# Patient Record
Sex: FEMALE | Race: WHITE
[De-identification: names, ages, dates, MRNs, and addresses within clinical notes are randomized per-mention and may not be internally consistent; named-entity substitution may affect disease eponyms.]

---

## 2017-11-16 ENCOUNTER — HOSPITAL ENCOUNTER (OUTPATIENT)
Dept: HOSPITAL 74 - FASU | Age: 72
LOS: 1 days | Discharge: HOME | End: 2017-11-17
Attending: ORTHOPAEDIC SURGERY
Payer: COMMERCIAL

## 2017-11-16 VITALS — BODY MASS INDEX: 36.7 KG/M2

## 2017-11-16 DIAGNOSIS — G89.29: ICD-10-CM

## 2017-11-16 DIAGNOSIS — M54.9: Primary | ICD-10-CM

## 2017-11-16 PROCEDURE — 0JH70BZ INSERTION OF SINGLE ARRAY STIMULATOR GENERATOR INTO BACK SUBCUTANEOUS TISSUE AND FASCIA, OPEN APPROACH: ICD-10-PCS | Performed by: ORTHOPAEDIC SURGERY

## 2017-11-16 PROCEDURE — 00HU0MZ INSERTION OF NEUROSTIMULATOR LEAD INTO SPINAL CANAL, OPEN APPROACH: ICD-10-PCS | Performed by: ORTHOPAEDIC SURGERY

## 2017-11-16 PROCEDURE — 01N80ZZ RELEASE THORACIC NERVE, OPEN APPROACH: ICD-10-PCS | Performed by: ORTHOPAEDIC SURGERY

## 2017-11-16 RX ADMIN — GABAPENTIN SCH MG: 100 CAPSULE ORAL at 21:05

## 2017-11-16 RX ADMIN — ACETAMINOPHEN SCH MG: 325 TABLET ORAL at 19:55

## 2017-11-16 RX ADMIN — HYDROMORPHONE HYDROCHLORIDE PRN MG: 1 INJECTION, SOLUTION INTRAMUSCULAR; INTRAVENOUS; SUBCUTANEOUS at 14:08

## 2017-11-16 RX ADMIN — CLINDAMYCIN PHOSPHATE SCH MLS/HR: 600 INJECTION, SOLUTION INTRAVENOUS at 19:55

## 2017-11-16 RX ADMIN — HYDROMORPHONE HYDROCHLORIDE PRN MG: 1 INJECTION, SOLUTION INTRAMUSCULAR; INTRAVENOUS; SUBCUTANEOUS at 14:18

## 2017-11-16 NOTE — SURG
Surgery First Assist Note


First Assist: Coleen Justice PA-C


Date of Service: 11/16/17


Diagnosis: 





spinal stenosis





Procedure: 








thoracic laminectomy T9-10 and T10-11, spianl cord stimulator


I was present for the entirety of the operative procedure. For further detail, 

please refer to operative report.








Visit type





- Case Type


Case Type: Scheduled Admission





- Emergency


Emergency Visit: No





- New patient


This patient is new to me today: Yes


Date on this admission: 11/16/17

## 2017-11-16 NOTE — OP
DATE OF OPERATION:  11/16/2017

 

PREOPERATIVE DIAGNOSIS:  Chronic back pain.

 

POSTOPERATIVE DIAGNOSIS:  Chronic back pain.

 

SURGERIES:

1.  Placement of spinal cord stimulator.

2.  Thoracic laminectomy.

 

SURGEON:  Lamberto Cope MD

 

ASSISTANT:  MICHELE Eugene

 

ESTIMATED BLOOD LOSS:  100 mL.

 

INTRAVENOUS FLUIDS:  Per Anesthesia.

 

ANESTHESIA:  General.

 

COMPLICATIONS:  There were none.

 

DISPOSITION:  The patient was brought to the PACU in stable condition.

 

INDICATION FOR SURGERY:  The patient is a 72-year-old female who has been suffering

from pain from her back over many years now.  She has been through an exhaustive

course of treatment for this.  She had a spinal cord stimulator trial and she had

received greater than 50% relief.  Unfortunately, her pain continued to persist, and

at that point after obtaining medical clearance, we discussed the placement of a

spinal cord stimulator, and the patient consented to surgery.

 

OPERATIVE NOTE:  The patient was brought to the operating room by Anesthesia.  After

appropriate patient identification was performed, general anesthesia was given.  She

was placed prone onto the OR table with all areas of bony prominences well added at

this time.

 

The C-arm was brought in and the T10-11 level was marked off.  Next, 10 mL of

lidocaine with epinephrine were injected into her back at this time.  Her back was

prepped and draped in the usual sterile manner.  At this point a timeout was

completed.

 

An incision was made from the top of T9 down to the bottom of T11.  Dissection was

carried down to the fascia and the fascia was split open at this time.  Appropriate

retractors were then placed in.  A spinal needle was placed onto the T10 spinous

process.  X-rays taken to confirm this was correct.  Needle was removed.  The spinous

process at T10 was removed.  A full laminectomy was performed from T9 to T11.  In

placing the paddle, it was felt that she had too much stenosis and it would be too

much compression to place the paddle.  Decision was made at that point to place

wires.  Wires were placed.  The wires were tunneled towards the battery.  The pocket

was made for the battery.  Battery was connected to the wires.  Testing was performed

and it appeared okay.  The fascia was closed with a number 1 Vicryl suture,

subcutaneous tissues were closed with 2-0 Vicryl suture, and skin was closed with 3-0

Monocryl suture.  Dermabond was applied.  Steri-Strips were applied.  A sterile

dressing was applied.

 

The patient was placed supine on the OR bed, extubated in the OR, and brought to the

PACU in stable condition.

 

 

LAMBERTO COPE M.D.

 

[id number]

DD: 11/16/2017 13:20

DT: 11/16/2017 14:31

Job #:  [id number]

## 2017-11-16 NOTE — OP
Operative Note





- Note:


Operative Date: 11/16/17


Pre-Operative Diagnosis: spinal stenosis


Operation: thoracic laminectomy T9-10 and T10-11, spianl cord stimulator


Surgeon: Carmelo Cope


Assistant: Coleen Justice


Anesthesiologist/CRNA: Jh Jewell


Anesthesia: General


Estimated Blood Loss (mls): 100


Fluid Volume Replaced (mls): 1,100


Operative Report Dictated: Yes

## 2017-11-17 VITALS — HEART RATE: 88 BPM | DIASTOLIC BLOOD PRESSURE: 79 MMHG | TEMPERATURE: 97.9 F | SYSTOLIC BLOOD PRESSURE: 130 MMHG

## 2017-11-17 RX ADMIN — GABAPENTIN SCH MG: 100 CAPSULE ORAL at 05:36

## 2017-11-17 RX ADMIN — CLINDAMYCIN PHOSPHATE SCH MLS/HR: 600 INJECTION, SOLUTION INTRAVENOUS at 00:44

## 2017-11-17 RX ADMIN — ACETAMINOPHEN SCH MG: 325 TABLET ORAL at 01:06

## 2017-11-17 RX ADMIN — ACETAMINOPHEN SCH MG: 325 TABLET ORAL at 08:00

## 2017-11-17 NOTE — PN
Progress Note (short form)





- Note


Progress Note: 





Retroactive note: patient seen at 7:30 am on 11/17/17





The patient was admitted to the Med-Surg Unit after an elective operative 

intervention for her spinal stenosis. Now POD #1 thoracic laminectomy T9-10 and 

T10-11, spinal cord stimulator implant patient is seen and examines at bedside. 

Pt c/o Left leg pain which is unchanged from pre op and states pain is 

controlled. She denies any fever, chill, n/v/d Cp or SOB. Patient has been OOB 

and ambulating with assistance, She is also voiding spontaneously.





PE: 


A&Ox3, NAD


 VSS


thoracic spine  and left lumbar incisions, with steris in palce and c/d/i with 

no evidence of discharge or tracking erythema. some area of irritation from    

tegaderm over inferior border of dressing area over lumbar paraveterbral 

incision,. no signs of infection. 


Pt with 5/5 UE and 5/5 LE strength


B/L Le compartments soft supple and non-tender to palpation


NVID with +2 pedal pulses


Incisions re-dressed with 4x4 and paper tape








Assesment:


s/p laminectomy with spinal cord stimulator implant doing well





Plan:


Yeny-operative IV ABX have been completed and DVT prophylaxis was achieved with 

SCDs and early ambulation.


The patient ambulated with Physical Therapy and no services were recommended 

upon discharge. Narcotic scripts were checked with [place]  prior to escibe. The 

discharge instructions and an oral pain management plan were reviewed with the 

patient. Patient states that she has tolerated oxycontin and percocet in the 

past. We will give her 5 days worth of medication for management of her acute 

pain and she will follow up with Dr. Bedoya and Dr. Cruz regarding further 

management and restarting her Suboxone. All questions answered. 





Above plan discussed with Dr. Cope and agreed.





<Smita Magaña - Last Filed: 11/17/17 12:53>





Physical Examination


Vital Signs: 


 Vital Signs











Temperature  97.9 F   11/17/17 05:34


 


Pulse Rate  88   11/17/17 05:34


 


Respiratory Rate  20   11/17/17 05:34


 


Blood Pressure  130/79   11/17/17 05:34


 


O2 Sat by Pulse Oximetry (%)  95   11/17/17 05:34








Patient seen and examined





Agree with above





Pain controlled with medicine





Patient to F/U with Dr Bedoya upon discharge





<Carmelo Cope - Last Filed: 11/20/17 09:33>

## 2019-11-30 ENCOUNTER — RESULT REVIEW (OUTPATIENT)
Age: 74
End: 2019-11-30

## 2019-12-19 ENCOUNTER — APPOINTMENT (OUTPATIENT)
Dept: PULMONOLOGY | Facility: CLINIC | Age: 74
End: 2019-12-19

## 2020-03-24 ENCOUNTER — APPOINTMENT (OUTPATIENT)
Dept: PAIN MANAGEMENT | Facility: CLINIC | Age: 75
End: 2020-03-24
Payer: MEDICARE

## 2020-03-24 VITALS
HEIGHT: 65 IN | WEIGHT: 190 LBS | DIASTOLIC BLOOD PRESSURE: 82 MMHG | SYSTOLIC BLOOD PRESSURE: 128 MMHG | BODY MASS INDEX: 31.65 KG/M2

## 2020-03-24 PROCEDURE — 99214 OFFICE O/P EST MOD 30 MIN: CPT

## 2020-03-24 NOTE — REVIEW OF SYSTEMS
[Back Pain] : back pain [Neck Pain] : neck pain [Joint Pain] : joint pain [Joint Stiffness] : joint stiffness [Negative] : Heme/Lymph [Depression] : depression

## 2020-04-03 ENCOUNTER — APPOINTMENT (OUTPATIENT)
Dept: PAIN MANAGEMENT | Facility: CLINIC | Age: 75
End: 2020-04-03
Payer: MEDICARE

## 2020-04-03 PROCEDURE — G2012 BRIEF CHECK IN BY MD/QHP: CPT

## 2020-04-03 NOTE — HISTORY OF PRESENT ILLNESS
[Back Pain] : back pain [Neck Pain] : neck pain [Joint Pain] : joint  [10] : a maximum pain level of 10/10 [Sharp] : sharp [Aching] : aching [Standing] : standing [Walking] : walking [Bending] : bending [Sitting] : sitting [FreeTextEntry1] : HPI\par \par Ms. SARAHI MARTINEZ is a 74 year F with PPHx of mood disorder followed by Dr. Foster, opioid use disorder previously suboxone SL 8-2mg and SL 2-0.5mg but has since discontinued, PMHx of spinal stenosis, HTN, s/p left femoral prosthesis, [employer] Scs device placed by Dr. Muniz.  Presents today with many years of bilateral lower back pain.  Patient states that the pain is worse with transition, axial, and prevents her from being able to tolerate ALDs.  It is even worse now that she is weaned from suboxone.  denies any worsening numbness, weakness, bowel/bladder dysfunction\par

## 2020-04-03 NOTE — ASSESSMENT
[FreeTextEntry1] : back and leg pain likely secondary to lumbar radiculopathy and discogenic pain refractory to conservative treatments in patient with hx of substance use disorder, will obtain MRI LS to evaluate for pathology\par \par may consider PT vs intervention pending eval\par \par patient should consult with Dr. Foster regarding increasing gabapentin to 600mg TID\par \par \par The above diagnosis and treatment plan is medically reasonable and necessary based on the patient encounter.\par \par There were no barriers to communication.\par Informed patient that I would be available for any additional questions.\par Patient was instructed to call with any worsening symptoms including severe pain, new numbness/weakness, or changes in the bowel/bladder function. \par \par Regarding opiate medication to manage pain. I had a detailed discussion with the patient regarding the risks of long-term opioid use, including the potential for medication side effects, hyperaglesia, endocrine dysfunction, addiction, tolerance, constipation, among others. Discussed relevant risks. \par \par \par Instructed patient to maintain pain diary to monitor pain level, mobility, and function.\par \par \par

## 2020-04-03 NOTE — PHYSICAL EXAM
[Normal muscle bulk without asymmetry] : normal muscle bulk without asymmetry [Facet Tenderness] : facet tenderness [Spine: Flexion to ___ degrees, without pain] : spine: flexion to [unfilled] degrees, without pain [Antalgic] : antalgic [Walker] : ambulates with walker [] : Motor: [NL] : normal and symmetric bilaterally [de-identified] : Constitutional: Normal, well developed, no acute distress\par Eyes: Symmetric, External structures \par Oropharynx: Lips normal, symmetric, no external lesions appreciated\par Respiratory: Non-labored breathing, no audible wheezes\par Cardiac: Pulse palpated, no tachycardia\par Vascular: No cyanosis appreciated, no edema in bilateral lower extremities\par GI: Nondistended, no jaundice appreciated\par Neurovascular: CN2-12 grossly intact, Alert and oriented\par MSK: Normal muscle bulk, 5/5 Motor strength B/L in LE\par \par

## 2020-05-15 ENCOUNTER — RESULT REVIEW (OUTPATIENT)
Age: 75
End: 2020-05-15

## 2020-05-18 ENCOUNTER — APPOINTMENT (OUTPATIENT)
Dept: PAIN MANAGEMENT | Facility: CLINIC | Age: 75
End: 2020-05-18

## 2020-05-22 ENCOUNTER — APPOINTMENT (OUTPATIENT)
Dept: PAIN MANAGEMENT | Facility: CLINIC | Age: 75
End: 2020-05-22
Payer: MEDICARE

## 2020-05-22 VITALS
HEIGHT: 65 IN | WEIGHT: 190 LBS | BODY MASS INDEX: 31.65 KG/M2 | DIASTOLIC BLOOD PRESSURE: 82 MMHG | SYSTOLIC BLOOD PRESSURE: 140 MMHG | TEMPERATURE: 98.8 F

## 2020-05-22 PROCEDURE — 99214 OFFICE O/P EST MOD 30 MIN: CPT

## 2020-05-22 RX ORDER — GABAPENTIN 300 MG/1
300 CAPSULE ORAL
Refills: 0 | Status: DISCONTINUED | COMMUNITY
End: 2020-05-22

## 2020-05-22 NOTE — PHYSICAL EXAM
[Normal muscle bulk without asymmetry] : normal muscle bulk without asymmetry [Facet Tenderness] : facet tenderness [Spine: Flexion to ___ degrees, without pain] : spine: flexion to [unfilled] degrees, without pain [Wheelchair] : uses a wheelchair [] : Motor: [Normal] : Normal affect [NL] : normal and symmetric bilaterally

## 2020-05-22 NOTE — HISTORY OF PRESENT ILLNESS
[10 (pain as bad as you can imagine)] : 1. What number best describes your pain on average in the past week? 10/10 pain [10 (completely interferes)] : 3. What number best describes how, during the past week, pain has interfered with your general activity? 10/10 pain [Back Pain] : back pain [Neck Pain] : neck pain [Joint Pain] : joint  [10] : a maximum pain level of 10/10 [Sharp] : sharp [Aching] : aching [Standing] : standing [Walking] : walking [Bending] : bending [Sitting] : sitting [FreeTextEntry1] : Interval Note:\par \par Since last visit the pain is not improved. Patient continues to have significant pain over her bilateral lower back radiating to bilateral buttock.  She states that she continues on gabapentin and started on topamax with Dr. Bass.  She states that the pain persists.  She is utilizing her scs device but it has only mildly improved her pain. Denies any additional weakness, numbness, bowel/bladder dysfunction.  \par \par \par HPI\par \par Ms. SARAHI MARTINEZ is a 74 year F with PPHx of mood disorder followed by Dr. Foster, opioid use disorder previously suboxone SL 8-2mg and SL 2-0.5mg but has since discontinued, PMHx of spinal stenosis, HTN, s/p left femoral prosthesis, [employer] Scs device placed by Dr. Muniz.  Presents today with many years of bilateral lower back pain.  Patient states that the pain is worse with transition, axial, and prevents her from being able to tolerate ALDs.  It is even worse now that she is weaned from suboxone.  denies any worsening numbness, weakness, bowel/bladder dysfunction\par \par \par Previous and current pain medications/doses/effects:\par \par Previous Pain Treatments:\par \par Previous Pain Injections:\par \par Previous Diagnostic Studies/Images:\par \par CT LS 5/2020\par \par There is sacralization of the L5 vertebral body.  The lumbar lordosis is preserved.  The\par vertebral body heights joint are maintained. There is mild disc space narrowing at at L1-L2.  A\par spinal stimulator is noted with the leads entering the thecal sac at the  T11-12 level.  The pre-\par vertebral soft tissues are unremarkable.\par \par  At the L1/2 level, there is a disc bulge without significant spinal canal stenosis or\par neuroforaminal narrowing..\par \par  At the L2/3 level, there is a disc bulge with facet and ligamentum flavum hypertrophy resulting in\par up to moderate spinal canal stenosis with mild to moderate bilateral neural foraminal narrowing..\par \par  At the L3/4 level, there is a disc bulge with facet and ligamentum flavum hypertrophy resulting in\par mild spinal canal stenosis with moderate bilateral neural foraminal narrowing.\par \par  At the L4/5 level, there is a disc bulge with facet and ligamentum flavum hypertrophy resulting in\par mild spinal canal stenosis with moderate to severe bilateral neural foraminal narrowing and possible\par impingement on the exiting nerve roots.\par \par  At the L5/S1 level, there is no disc herniation. There is no spinal canal stenosis or neural\par foramen narrowing.\par \par \par  IMPRESSION:\par \par  L4-5 degenerative changes resulting in mild spinal canal stenosis with moderate to severe bilateral\par neural foraminal narrowing and possible impingement on the exiting nerve root.\par \par  L3-4 degenerative changes resulting in moderate bilateral neural foraminal narrowing.\par  [FreeTextEntry2] : 30

## 2020-05-22 NOTE — ASSESSMENT
[FreeTextEntry1] : back and leg pain likely secondary to lumbar radiculopathy and discogenic pain refractory to conservative treatments in patient with hx of substance use disorder\par \par persistent pain secondary to lumbosacral stenosis demonstrated on imaging refractory to conservative treatments, will schedule caudal epidural steroid injection r/b/a discussed\par \par informed patient of risks of steroid administration including transient worsening of blood glucose, htn, mood changes, progressive osteoporosis.  Encouraged to call with questions and concerns.\par \par ASIPP COVID Morbidity risk stratification:\par \par Age 2\par Pulmonary 3\par CVS 2\par Obesity 2\par DM 0\par Renal 0 \par Hepatic 0\par Immune 0\par \par 11  high risk for covid related morbidity -  discussed r/b/a, patient wishes to proceed\par \par Will schedule above interventional pain procedure because further delay may cause harm or negative outcome to patient (c/w [place] regulations and CMS Priority Category 3).  The goal in performing this procedure is to avoid deterioration of function, emergency room visits (which increases exposure) and reliance on opioids.  \par \par r/b/a discussed with patient, lack of evidence to conclusively determine whether pain management procedures have any positive or negative impact on the possibility of alex the virus and/or development of any sequelae. \par \par Informed patient that risks associated with the COVID-19 infection.  Informed patient steps taken to limit the risks.  We are implementing safety precautions and following protocols consistent with the CDC and state recommendations. All patients and staff will be checked for fever or signs of illness upon entry to the facility. We will limit our steroid dose to the lowest effective therapeutic dose or in some cases steroids will not be injected at all. \par \par Patient agrees to proceed\par \par \par \par patient should consult with Dr. Bass regarding increasing gabapentin to 600mg TID\par \par may consider lumbar mbb for facet arthropathy and spondylosis\par \par The above diagnosis and treatment plan is medically reasonable and necessary based on the patient encounter.\par \par There were no barriers to communication.\par Informed patient that I would be available for any additional questions.\par Patient was instructed to call with any worsening symptoms including severe pain, new numbness/weakness, or changes in the bowel/bladder function. \par Regarding opiate medication to manage pain. I had a detailed discussion with the patient regarding the risks of long-term opioid use, including the potential for medication side effects, hyperaglesia, endocrine dysfunction, addiction, tolerance, constipation, among others. Discussed relevant risks. \par \par \par Instructed patient to maintain pain diary to monitor pain level, mobility, and function.\par \par \par

## 2020-06-09 ENCOUNTER — RESULT REVIEW (OUTPATIENT)
Age: 75
End: 2020-06-09

## 2020-06-11 ENCOUNTER — RESULT REVIEW (OUTPATIENT)
Age: 75
End: 2020-06-11

## 2020-06-11 ENCOUNTER — APPOINTMENT (OUTPATIENT)
Dept: PAIN MANAGEMENT | Facility: HOSPITAL | Age: 75
End: 2020-06-11

## 2020-06-26 ENCOUNTER — APPOINTMENT (OUTPATIENT)
Dept: PAIN MANAGEMENT | Facility: CLINIC | Age: 75
End: 2020-06-26
Payer: MEDICARE

## 2020-06-26 VITALS
DIASTOLIC BLOOD PRESSURE: 80 MMHG | TEMPERATURE: 98.4 F | WEIGHT: 190 LBS | HEIGHT: 65 IN | SYSTOLIC BLOOD PRESSURE: 140 MMHG | BODY MASS INDEX: 31.65 KG/M2

## 2020-06-26 PROCEDURE — 95971 ALYS SMPL SP/PN NPGT W/PRGRM: CPT

## 2020-06-26 PROCEDURE — 99214 OFFICE O/P EST MOD 30 MIN: CPT | Mod: 25

## 2020-06-26 NOTE — ASSESSMENT
[FreeTextEntry1] : back and leg pain likely secondary to lumbar radiculopathy and discogenic pain refractory to conservative treatments in patient with hx of substance use disorder\par \par persistent pain secondary to lumbosacral stenosis demonstrated on imaging refractory to conservative treatments, s/p caudal epidural steroid injection with improvement in radicular pain\par \par SCS device interrogated and reprogrammed today\par \par Significant component of axial back pain secondary to lumbar spondylosis and facet arthropathy demonstrated on MRI LS.  Pain refractory to conservative treatments.  Will schedule BILATERAL L3-sacral ala diagnostic medial branch block r/b/a discussed\par \par May consider radiofreqency ablation\par \par R knee pain secondary to osteoarthritis XR to evaluate\par \par Progressive right knee pain secondary to osteoarthritis refractory to conservative treatments, will schedule right knee steroid injection r/b/a discussed\par \par informed patient of risks of steroid administration including transient worsening of blood glucose, htn, mood changes, progressive osteoporosis.  Encouraged to call with questions and concerns.\par \par \par ASIPP COVID Morbidity risk stratification:\par \par Age 2\par Pulmonary 3\par CVS 2\par Obesity 2\par DM 0\par Renal 0 \par Hepatic 0\par Immune 0\par \par 11  moderate risk for covid related morbidity -  discussed r/b/a, patient wishes to proceed\par \par Will schedule above interventional pain procedure because further delay may cause harm or negative outcome to patient.  The goal in performing this procedure is to avoid deterioration of function, emergency room visits (which increases exposure) and reliance on opioids.  \par \par r/b/a discussed with patient, lack of evidence to conclusively determine whether pain management procedures have any positive or negative impact on the possibility of alex the virus and/or development of any sequelae. \par \par Informed patient that risks associated with the COVID-19 infection.  Informed patient steps taken to limit the risks.  We are implementing safety precautions and following protocols consistent with the CDC and state recommendations. All patients and staff will be checked for fever or signs of illness upon entry to the facility. We will limit our steroid dose to the lowest effective therapeutic dose or in some cases steroids will not be injected at all. \par \par Patient agrees to proceed\par \par \par \par patient should consult with Dr. Bass regarding increasing gabapentin to 600mg TID\par \par \par \par The above diagnosis and treatment plan is medically reasonable and necessary based on the patient encounter.\par \par There were no barriers to communication.\par Informed patient that I would be available for any additional questions.\par Patient was instructed to call with any worsening symptoms including severe pain, new numbness/weakness, or changes in the bowel/bladder function. \par Regarding opiate medication to manage pain. I had a detailed discussion with the patient regarding the risks of long-term opioid use, including the potential for medication side effects, hyperaglesia, endocrine dysfunction, addiction, tolerance, constipation, among others. Discussed relevant risks. \par \par \par Instructed patient to maintain pain diary to monitor pain level, mobility, and function.\par \par \par

## 2020-06-26 NOTE — PHYSICAL EXAM
[Normal] : Well developed, in no acute distress, alert and oriented to person, place and time [Normal muscle bulk without asymmetry] : normal muscle bulk without asymmetry [Facet Tenderness] : facet tenderness [Spine: Flexion to ___ degrees, without pain] : spine: flexion to [unfilled] degrees, without pain [] : Motor: [NL] : normal and symmetric bilaterally

## 2020-06-26 NOTE — HISTORY OF PRESENT ILLNESS
[9] : 3. What number best describes how, during the past week, pain has interfered with your general activity? 9/10 pain [Neck Pain] : neck pain [Back Pain] : back pain [Joint Pain] : joint  [Sharp] : sharp [10] : a maximum pain level of 10/10 [Standing] : standing [Aching] : aching [Walking] : walking [Bending] : bending [Sitting] : sitting [FreeTextEntry1] : Interval Note:\par s/p caudal epidural steroid injection 6/11/2020 with improvement in radicular pain.  She continues to have significant bilateral lower back pain.  pain is axial and worse with transition.  She also complains of worsening right knee pain that affects her ability to ambulate.  Knee pain is worse with weight bearing. \par  She states that she continues on gabapentin and started on topamax with Dr. Bass.  She states that the pain persists.  She is utilizing her scs device but it has only mildly improved her pain. Denies any additional weakness, numbness, bowel/bladder dysfunction.  \par \par \par HPI\par \par Ms. SARAHI MARTINEZ is a 74 year F with PPHx of mood disorder followed by Dr. Foster, opioid use disorder previously suboxone SL 8-2mg and SL 2-0.5mg but has since discontinued, PMHx of spinal stenosis, HTN, s/p left femoral prosthesis, [employer] Scs device placed by Dr. Muniz.  Presents today with many years of bilateral lower back pain.  Patient states that the pain is worse with transition, axial, and prevents her from being able to tolerate ALDs.  It is even worse now that she is weaned from suboxone.  denies any worsening numbness, weakness, bowel/bladder dysfunction\par \par \par Previous and current pain medications/doses/effects:\par \par Previous Pain Treatments:\par \par Previous Pain Injections:\par \par  caudal epidural steroid injection 6/11/2020\par \par Previous Diagnostic Studies/Images:\par \par CT LS 5/2020\par \par There is sacralization of the L5 vertebral body.  The lumbar lordosis is preserved.  The\par vertebral body heights joint are maintained. There is mild disc space narrowing at at L1-L2.  A\par spinal stimulator is noted with the leads entering the thecal sac at the  T11-12 level.  The pre-\par vertebral soft tissues are unremarkable.\par \par  At the L1/2 level, there is a disc bulge without significant spinal canal stenosis or\par neuroforaminal narrowing..\par \par  At the L2/3 level, there is a disc bulge with facet and ligamentum flavum hypertrophy resulting in\par up to moderate spinal canal stenosis with mild to moderate bilateral neural foraminal narrowing..\par \par  At the L3/4 level, there is a disc bulge with facet and ligamentum flavum hypertrophy resulting in\par mild spinal canal stenosis with moderate bilateral neural foraminal narrowing.\par \par  At the L4/5 level, there is a disc bulge with facet and ligamentum flavum hypertrophy resulting in\par mild spinal canal stenosis with moderate to severe bilateral neural foraminal narrowing and possible\par impingement on the exiting nerve roots.\par \par  At the L5/S1 level, there is no disc herniation. There is no spinal canal stenosis or neural\par foramen narrowing.\par \par \par  IMPRESSION:\par \par  L4-5 degenerative changes resulting in mild spinal canal stenosis with moderate to severe bilateral\par neural foraminal narrowing and possible impingement on the exiting nerve root.\par \par  L3-4 degenerative changes resulting in moderate bilateral neural foraminal narrowing.\par  [FreeTextEntry2] : 27

## 2020-07-14 ENCOUNTER — RESULT REVIEW (OUTPATIENT)
Age: 75
End: 2020-07-14

## 2020-07-16 ENCOUNTER — APPOINTMENT (OUTPATIENT)
Dept: PAIN MANAGEMENT | Facility: HOSPITAL | Age: 75
End: 2020-07-16

## 2020-07-16 ENCOUNTER — RESULT REVIEW (OUTPATIENT)
Age: 75
End: 2020-07-16

## 2020-07-17 ENCOUNTER — APPOINTMENT (OUTPATIENT)
Dept: PAIN MANAGEMENT | Facility: HOSPITAL | Age: 75
End: 2020-07-17

## 2020-08-04 ENCOUNTER — APPOINTMENT (OUTPATIENT)
Dept: PAIN MANAGEMENT | Facility: CLINIC | Age: 75
End: 2020-08-04
Payer: MEDICARE

## 2020-08-04 VITALS
WEIGHT: 190 LBS | SYSTOLIC BLOOD PRESSURE: 122 MMHG | HEIGHT: 66 IN | DIASTOLIC BLOOD PRESSURE: 60 MMHG | TEMPERATURE: 98.5 F | BODY MASS INDEX: 30.53 KG/M2

## 2020-08-04 PROCEDURE — 99214 OFFICE O/P EST MOD 30 MIN: CPT

## 2020-08-04 NOTE — ASSESSMENT
[FreeTextEntry1] : back and leg pain likely secondary to lumbar radiculopathy and discogenic pain refractory to conservative treatments in patient with hx of substance use disorder\par \par persistent pain secondary to lumbosacral stenosis demonstrated on imaging refractory to conservative treatments, s/p caudal epidural steroid injection with improvement in radicular pain\par \par SCS device interrogated and reprogrammed today\par \par Significant component of axial back pain secondary to lumbar spondylosis and facet arthropathy demonstrated on MRI LS.  Pain refractory to conservative treatments.  s/p BILATERAL L3-sacral ala diagnostic medial branch block \par \par Given significant relief from diagnostic lumbar medial branch block of >80%, and continued lumbar facet arthropathy pain and axial back pain, will schedule LEFT then RIGHT  L3-sacral ala medial branch radiofrequency ablation at 80C for 2:30 min r/b/a discussed\par \par Will schedule above interventional pain procedure because further delay may cause harm or negative outcome to patient.  The goal in performing this procedure is to avoid deterioration of function, emergency room visits (which increases exposure) and reliance on opioids.  \par \par r/b/a discussed with patient, lack of evidence to conclusively determine whether pain management procedures have any positive or negative impact on the possibility of alex the virus and/or development of any sequelae. \par \par Informed patient that risks associated with the COVID-19 infection.  Informed patient steps taken to limit the risks.  We are implementing safety precautions and following protocols consistent with the CDC and state recommendations. All patients and staff will be checked for fever or signs of illness upon entry to the facility. We will limit our steroid dose to the lowest effective therapeutic dose or in some cases steroids will not be injected at all. \par \par Patient agrees to proceed\par \par \par Progressive right knee pain secondary to osteoarthritis refractory to conservative treatments, s/p e right knee steroid injection with moderate improvement in knee pain\par \par \par patient should consult with Dr. Bass regarding increasing gabapentin to 600mg TID\par \par \par \par The above diagnosis and treatment plan is medically reasonable and necessary based on the patient encounter.\par \par There were no barriers to communication.\par Informed patient that I would be available for any additional questions.\par \par Again reinforced that chronic opioid use for chronic msk pain is not the answer and can be dangerous in patient w hx of substance use disorder\par \par Patient was instructed to call with any worsening symptoms including severe pain, new numbness/weakness, or changes in the bowel/bladder function. \par Regarding opiate medication to manage pain. I had a detailed discussion with the patient regarding the risks of long-term opioid use, including the potential for medication side effects, hyperaglesia, endocrine dysfunction, addiction, tolerance, constipation, among others. Discussed relevant risks. \par \par \par Instructed patient to maintain pain diary to monitor pain level, mobility, and function.\par \par \par

## 2020-08-04 NOTE — HISTORY OF PRESENT ILLNESS
[8] : 2. What number best describes how, during the past week, pain has interfered with your enjoyment of life? 8/10 pain [9] : 3. What number best describes how, during the past week, pain has interfered with your general activity? 9/10 pain [Back Pain] : back pain [Neck Pain] : neck pain [Joint Pain] : joint  [10] : a maximum pain level of 10/10 [Standing] : standing [Aching] : aching [Sharp] : sharp [Walking] : walking [Bending] : bending [Sitting] : sitting [FreeTextEntry2] : 23 [FreeTextEntry1] : Interval Note:\par \par s/p  BILATERAL L3-sacral ala diagnostic medial branch block 7/17/2020 with transient 80% improvement in axial back pain. She continues to pain over the right knee\par  She states that she continues on gabapentin and started on topamax with Dr. Bass.  She states that the pain persists.  She is utilizing her scs device but it has only mildly improved her pain. Denies any additional weakness, numbness, bowel/bladder dysfunction.  \par \par Patient's  who is at the visit, demonstrated frustration in her discomfort and continues to demand "stronger pain killers"\par \par \par HPI\par \par Ms. SARAHI MARTINEZ is a 74 year F with PPHx of mood disorder followed by Dr. Foster, opioid use disorder previously suboxone SL 8-2mg and SL 2-0.5mg but has since discontinued, PMHx of spinal stenosis, HTN, s/p left femoral prosthesis, [employer] Scs device placed by Dr. Muniz.  Presents today with many years of bilateral lower back pain.  Patient states that the pain is worse with transition, axial, and prevents her from being able to tolerate ALDs.  It is even worse now that she is weaned from suboxone.  denies any worsening numbness, weakness, bowel/bladder dysfunction\par \par \par Previous and current pain medications/doses/effects:\par \par Previous Pain Treatments:\par \par Previous Pain Injections:\par  right knee steroid injection 7/17/2020\par BILATERAL L3-sacral ala diagnostic medial branch block 7/16/2020\par  caudal epidural steroid injection 6/11/2020\par \par Previous Diagnostic Studies/Images:\par \par XR R knee 7/2020\par \par Severe degenerative arthritis of the right knee\par \par CT LS 5/2020\par \par There is sacralization of the L5 vertebral body.  The lumbar lordosis is preserved.  The\par vertebral body heights joint are maintained. There is mild disc space narrowing at at L1-L2.  A\par spinal stimulator is noted with the leads entering the thecal sac at the  T11-12 level.  The pre-\par vertebral soft tissues are unremarkable.\par \par  At the L1/2 level, there is a disc bulge without significant spinal canal stenosis or\par neuroforaminal narrowing..\par \par  At the L2/3 level, there is a disc bulge with facet and ligamentum flavum hypertrophy resulting in\par up to moderate spinal canal stenosis with mild to moderate bilateral neural foraminal narrowing..\par \par  At the L3/4 level, there is a disc bulge with facet and ligamentum flavum hypertrophy resulting in\par mild spinal canal stenosis with moderate bilateral neural foraminal narrowing.\par \par  At the L4/5 level, there is a disc bulge with facet and ligamentum flavum hypertrophy resulting in\par mild spinal canal stenosis with moderate to severe bilateral neural foraminal narrowing and possible\par impingement on the exiting nerve roots.\par \par  At the L5/S1 level, there is no disc herniation. There is no spinal canal stenosis or neural\par foramen narrowing.\par \par \par  IMPRESSION:\par \par  L4-5 degenerative changes resulting in mild spinal canal stenosis with moderate to severe bilateral\par neural foraminal narrowing and possible impingement on the exiting nerve root.\par \par  L3-4 degenerative changes resulting in moderate bilateral neural foraminal narrowing.\par

## 2020-08-04 NOTE — PHYSICAL EXAM
[Facet Tenderness] : facet tenderness [Normal] : Well developed, in no acute distress, alert and oriented to person, place and time [Spine: Flexion to ___ degrees, without pain] : spine: flexion to [unfilled] degrees, without pain [] : Motor: [NL] : normal and symmetric bilaterally

## 2020-08-11 ENCOUNTER — APPOINTMENT (OUTPATIENT)
Dept: PAIN MANAGEMENT | Facility: CLINIC | Age: 75
End: 2020-08-11
Payer: MEDICARE

## 2020-08-11 VITALS
BODY MASS INDEX: 30.53 KG/M2 | WEIGHT: 190 LBS | TEMPERATURE: 97.8 F | DIASTOLIC BLOOD PRESSURE: 70 MMHG | SYSTOLIC BLOOD PRESSURE: 130 MMHG | HEIGHT: 66 IN

## 2020-08-11 PROCEDURE — 99214 OFFICE O/P EST MOD 30 MIN: CPT

## 2020-08-11 NOTE — PHYSICAL EXAM
[Normal] : Well developed, in no acute distress, alert and oriented to person, place and time [Normal Spine curvature] : normal spine curvature [Facet Tenderness] : facet tenderness [Antalgic] : antalgic [Walker] : ambulates with walker [] : Motor: [NL] : normal and symmetric bilaterally [de-identified] : right knee TTP \par pain on valgus and varus strain\par

## 2020-08-11 NOTE — HISTORY OF PRESENT ILLNESS
[10 (pain as bad as you can imagine)] : 1. What number best describes your pain on average in the past week? 10/10 pain [5] : 3. What number best describes how, during the past week, pain has interfered with your general activity? 5/10 pain [Back Pain] : back pain [Neck Pain] : neck pain [Joint Pain] : joint  [10] : a maximum pain level of 10/10 [Sharp] : sharp [Aching] : aching [Standing] : standing [Walking] : walking [Bending] : bending [Sitting] : sitting [FreeTextEntry1] : Interval Note:\par Patient returns today complaining of significant right knee pain that is worsening over the last few days.  She states that it is preventing her ability to ambulate and she continues to have significant breakthrough pain throughout the day that feels like spasms. \par s/p  BILATERAL L3-sacral ala diagnostic medial branch block 7/17/2020 with transient 80% improvement in axial back pain and is pending rfa\par   She states that the pain persists.  She is utilizing her scs device but it has only mildly improved her pain. Denies any additional weakness, numbness, bowel/bladder dysfunction.  \par \par \par \par HPI\par \par Ms. SARAHI MARTINEZ is a 74 year F with PPHx of mood disorder followed by Dr. Foster, opioid use disorder previously suboxone SL 8-2mg and SL 2-0.5mg but has since discontinued, PMHx of spinal stenosis, HTN, s/p left femoral prosthesis, [employer] Scs device placed by Dr. Muniz.  Presents today with many years of bilateral lower back pain.  Patient states that the pain is worse with transition, axial, and prevents her from being able to tolerate ALDs.  It is even worse now that she is weaned from suboxone.  denies any worsening numbness, weakness, bowel/bladder dysfunction\par \par \par Previous and current pain medications/doses/effects:\par \par Previous Pain Treatments:\par \par Previous Pain Injections:\par  right knee steroid injection 7/17/2020\par BILATERAL L3-sacral ala diagnostic medial branch block 7/16/2020\par  caudal epidural steroid injection 6/11/2020\par \par Previous Diagnostic Studies/Images:\par \par XR R knee 7/2020\par \par Severe degenerative arthritis of the right knee\par \par CT LS 5/2020\par \par There is sacralization of the L5 vertebral body.  The lumbar lordosis is preserved.  The\par vertebral body heights joint are maintained. There is mild disc space narrowing at at L1-L2.  A\par spinal stimulator is noted with the leads entering the thecal sac at the  T11-12 level.  The pre-\par vertebral soft tissues are unremarkable.\par \par  At the L1/2 level, there is a disc bulge without significant spinal canal stenosis or\par neuroforaminal narrowing..\par \par  At the L2/3 level, there is a disc bulge with facet and ligamentum flavum hypertrophy resulting in\par up to moderate spinal canal stenosis with mild to moderate bilateral neural foraminal narrowing..\par \par  At the L3/4 level, there is a disc bulge with facet and ligamentum flavum hypertrophy resulting in\par mild spinal canal stenosis with moderate bilateral neural foraminal narrowing.\par \par  At the L4/5 level, there is a disc bulge with facet and ligamentum flavum hypertrophy resulting in\par mild spinal canal stenosis with moderate to severe bilateral neural foraminal narrowing and possible\par impingement on the exiting nerve roots.\par \par  At the L5/S1 level, there is no disc herniation. There is no spinal canal stenosis or neural\par foramen narrowing.\par \par \par  IMPRESSION:\par \par  L4-5 degenerative changes resulting in mild spinal canal stenosis with moderate to severe bilateral\par neural foraminal narrowing and possible impingement on the exiting nerve root.\par \par  L3-4 degenerative changes resulting in moderate bilateral neural foraminal narrowing.\par  [FreeTextEntry2] : 20

## 2020-08-11 NOTE — ASSESSMENT
[FreeTextEntry1] : back and leg pain likely secondary to lumbar radiculopathy and discogenic pain refractory to conservative treatments in patient with hx of substance use disorder\par \par Right knee pain secondary to arthritis refractory to conservative treatments and steroid injection.  Significantly debilitating to patient.  Will schedule right knee Viscosupplementation injection x 3 \par \par r/b/a discussed\par \par trial tizanidine prn spasm\par \par persistent pain secondary to lumbosacral stenosis demonstrated on imaging refractory to conservative treatments, s/p caudal epidural steroid injection with improvement in radicular pain\par \par SCS device interrogated and reprogrammed today\par \par Significant component of axial back pain secondary to lumbar spondylosis and facet arthropathy demonstrated on MRI LS.  Pain refractory to conservative treatments.  s/p BILATERAL L3-sacral ala diagnostic medial branch block \par \par Given significant relief from diagnostic lumbar medial branch block of >80%, and continued lumbar facet arthropathy pain and axial back pain, will schedule LEFT then RIGHT  L3-sacral ala medial branch radiofrequency ablation at 80C for 2:30 min r/b/a discussed\par \par Will schedule above interventional pain procedure because further delay may cause harm or negative outcome to patient.  The goal in performing this procedure is to avoid deterioration of function, emergency room visits (which increases exposure) and reliance on opioids.  \par \par r/b/a discussed with patient, lack of evidence to conclusively determine whether pain management procedures have any positive or negative impact on the possibility of alex the virus and/or development of any sequelae. \par \par Informed patient that risks associated with the COVID-19 infection.  Informed patient steps taken to limit the risks.  We are implementing safety precautions and following protocols consistent with the CDC and state recommendations. All patients and staff will be checked for fever or signs of illness upon entry to the facility. We will limit our steroid dose to the lowest effective therapeutic dose or in some cases steroids will not be injected at all. \par \par Patient agrees to proceed\par \par \par Progressive right knee pain secondary to osteoarthritis refractory to conservative treatments, s/p e right knee steroid injection with moderate improvement in knee pain\par \par \par patient should consult with Dr. Bass regarding increasing gabapentin to 600mg TID\par \par \par \par The above diagnosis and treatment plan is medically reasonable and necessary based on the patient encounter.\par \par There were no barriers to communication.\par Informed patient that I would be available for any additional questions.\par \par Again reinforced that chronic opioid use for chronic msk pain is not the answer and can be dangerous in patient w hx of substance use disorder\par \par Patient was instructed to call with any worsening symptoms including severe pain, new numbness/weakness, or changes in the bowel/bladder function. \par Regarding opiate medication to manage pain. I had a detailed discussion with the patient regarding the risks of long-term opioid use, including the potential for medication side effects, hyperaglesia, endocrine dysfunction, addiction, tolerance, constipation, among others. Discussed relevant risks. \par \par \par Instructed patient to maintain pain diary to monitor pain level, mobility, and function.\par \par \par

## 2020-08-18 ENCOUNTER — RESULT REVIEW (OUTPATIENT)
Age: 75
End: 2020-08-18

## 2020-08-20 ENCOUNTER — APPOINTMENT (OUTPATIENT)
Dept: PAIN MANAGEMENT | Facility: HOSPITAL | Age: 75
End: 2020-08-20

## 2020-08-21 ENCOUNTER — RESULT REVIEW (OUTPATIENT)
Age: 75
End: 2020-08-21

## 2020-08-21 ENCOUNTER — APPOINTMENT (OUTPATIENT)
Dept: PAIN MANAGEMENT | Facility: HOSPITAL | Age: 75
End: 2020-08-21

## 2020-08-25 ENCOUNTER — RESULT REVIEW (OUTPATIENT)
Age: 75
End: 2020-08-25

## 2020-08-27 ENCOUNTER — APPOINTMENT (OUTPATIENT)
Dept: PAIN MANAGEMENT | Facility: HOSPITAL | Age: 75
End: 2020-08-27

## 2020-08-28 ENCOUNTER — RESULT REVIEW (OUTPATIENT)
Age: 75
End: 2020-08-28

## 2020-08-28 ENCOUNTER — APPOINTMENT (OUTPATIENT)
Dept: PAIN MANAGEMENT | Facility: HOSPITAL | Age: 75
End: 2020-08-28

## 2020-08-31 ENCOUNTER — APPOINTMENT (OUTPATIENT)
Dept: PAIN MANAGEMENT | Facility: HOSPITAL | Age: 75
End: 2020-08-31

## 2020-08-31 ENCOUNTER — RESULT REVIEW (OUTPATIENT)
Age: 75
End: 2020-08-31

## 2020-09-10 ENCOUNTER — RX RENEWAL (OUTPATIENT)
Age: 75
End: 2020-09-10

## 2020-09-13 ENCOUNTER — RESULT REVIEW (OUTPATIENT)
Age: 75
End: 2020-09-13

## 2020-09-16 ENCOUNTER — APPOINTMENT (OUTPATIENT)
Dept: PAIN MANAGEMENT | Facility: HOSPITAL | Age: 75
End: 2020-09-16

## 2020-10-02 ENCOUNTER — APPOINTMENT (OUTPATIENT)
Dept: PAIN MANAGEMENT | Facility: CLINIC | Age: 75
End: 2020-10-02
Payer: MEDICARE

## 2020-10-02 VITALS
BODY MASS INDEX: 30.53 KG/M2 | SYSTOLIC BLOOD PRESSURE: 150 MMHG | WEIGHT: 190 LBS | DIASTOLIC BLOOD PRESSURE: 80 MMHG | HEIGHT: 66 IN | TEMPERATURE: 97 F

## 2020-10-02 PROCEDURE — 99214 OFFICE O/P EST MOD 30 MIN: CPT | Mod: 25

## 2020-10-02 PROCEDURE — 95971 ALYS SMPL SP/PN NPGT W/PRGRM: CPT

## 2020-10-02 NOTE — HISTORY OF PRESENT ILLNESS
[7] : 3. What number best describes how, during the past week, pain has interfered with your general activity? 7/10 pain [Back Pain] : back pain [Neck Pain] : neck pain [Joint Pain] : joint  [10] : a maximum pain level of 10/10 [Sharp] : sharp [Aching] : aching [Standing] : standing [Walking] : walking [Bending] : bending [Sitting] : sitting [FreeTextEntry1] : Interval Note:\par \par Patient is now s/p bilateral L3-sacral ala mb rfa, right knee vs treatment.  She continues to complain of back and leg pain but is very satisfied with the fact that she is now off of all opioid treatments.  She continues PT with improvement.  Denies any additional weakness, numbness, bowel/bladder dysfunction.  \par \par \par \par HPI\par \par Ms. SARAHI MARTINEZ is a 74 year F with PPHx of mood disorder followed by Dr. Foster, opioid use disorder previously suboxone SL 8-2mg and SL 2-0.5mg but has since discontinued, PMHx of spinal stenosis, HTN, s/p left femoral prosthesis, [employer] Scs device placed by Dr. Muniz.  Presents today with many years of bilateral lower back pain.  Patient states that the pain is worse with transition, axial, and prevents her from being able to tolerate ALDs.  It is even worse now that she is weaned from suboxone.  denies any worsening numbness, weakness, bowel/bladder dysfunction\par \par \par Previous and current pain medications/doses/effects:\par \par Previous Pain Treatments:\par \par Previous Pain Injections:\par LEFT then RIGHT  L3-sacral ala medial branch radiofrequency ablation 8/2020\par  right knee Viscosupplementation injection completed 9/16/2020\par  right knee steroid injection 7/17/2020\par BILATERAL L3-sacral ala diagnostic medial branch block 7/16/2020\par  caudal epidural steroid injection 6/11/2020\par \par Previous Diagnostic Studies/Images:\par \par XR R knee 7/2020\par \par Severe degenerative arthritis of the right knee\par \par CT LS 5/2020\par \par There is sacralization of the L5 vertebral body.  The lumbar lordosis is preserved.  The\par vertebral body heights joint are maintained. There is mild disc space narrowing at at L1-L2.  A\par spinal stimulator is noted with the leads entering the thecal sac at the  T11-12 level.  The pre-\par vertebral soft tissues are unremarkable.\par \par  At the L1/2 level, there is a disc bulge without significant spinal canal stenosis or\par neuroforaminal narrowing..\par \par  At the L2/3 level, there is a disc bulge with facet and ligamentum flavum hypertrophy resulting in\par up to moderate spinal canal stenosis with mild to moderate bilateral neural foraminal narrowing..\par \par  At the L3/4 level, there is a disc bulge with facet and ligamentum flavum hypertrophy resulting in\par mild spinal canal stenosis with moderate bilateral neural foraminal narrowing.\par \par  At the L4/5 level, there is a disc bulge with facet and ligamentum flavum hypertrophy resulting in\par mild spinal canal stenosis with moderate to severe bilateral neural foraminal narrowing and possible\par impingement on the exiting nerve roots.\par \par  At the L5/S1 level, there is no disc herniation. There is no spinal canal stenosis or neural\par foramen narrowing.\par \par \par  IMPRESSION:\par \par  L4-5 degenerative changes resulting in mild spinal canal stenosis with moderate to severe bilateral\par neural foraminal narrowing and possible impingement on the exiting nerve root.\par \par  L3-4 degenerative changes resulting in moderate bilateral neural foraminal narrowing.\par  [FreeTextEntry2] : 21

## 2020-10-02 NOTE — PHYSICAL EXAM
[Normal] : Well developed, in no acute distress, alert and oriented to person, place and time [Cane] : ambulates with cane [] : Motor: [NL] : normal and symmetric bilaterally

## 2020-10-02 NOTE — ASSESSMENT
[FreeTextEntry1] : back and leg pain likely secondary to lumbar radiculopathy and discogenic pain refractory to conservative treatments in patient with hx of substance use disorder\par \par Right knee pain secondary to arthritis refractory to conservative treatments and steroid injection.  Significantly debilitating to patient.  Will schedule right knee Viscosupplementation injection x 3 \par \par \par continue tizanidine prn spasm\par \par persistent pain secondary to lumbosacral stenosis demonstrated on imaging refractory to conservative treatments, s/p caudal epidural steroid injection with improvement in radicular pain\par \par SCS device interrogated and reprogrammed today - scanned into chart\par \par Significant component of axial back pain secondary to lumbar spondylosis and facet arthropathy demonstrated on MRI LS.  Pain refractory to conservative treatments.  s/p BILATERAL L3-sacral ala diagnostic medial branch block \par \par Given significant relief from diagnostic lumbar medial branch block of >80%, and continued lumbar facet arthropathy pain and axial back pain,s/p LEFT then RIGHT  L3-sacral ala medial branch radiofrequency ablation \par Progressive right knee pain secondary to osteoarthritis refractory to conservative treatments, s/p  right knee steroid injection with moderate improvement in knee pain\par \par restart PT - referral provided\par \par patient should consult with Dr. Bass regarding increasing gabapentin to 600mg TID\par \par The above diagnosis and treatment plan is medically reasonable and necessary based on the patient encounter.\par \par There were no barriers to communication.\par Informed patient that I would be available for any additional questions.\par \par Again reinforced that chronic opioid use for chronic msk pain is not the answer and can be dangerous in patient w hx of substance use disorder\par \par Patient was instructed to call with any worsening symptoms including severe pain, new numbness/weakness, or changes in the bowel/bladder function. \par Regarding opiate medication to manage pain. I had a detailed discussion with the patient regarding the risks of long-term opioid use, including the potential for medication side effects, hyperaglesia, endocrine dysfunction, addiction, tolerance, constipation, among others. Discussed relevant risks. \par \par \par Instructed patient to maintain pain diary to monitor pain level, mobility, and function.\par \par \par

## 2020-10-29 ENCOUNTER — RX RENEWAL (OUTPATIENT)
Age: 75
End: 2020-10-29

## 2020-11-03 ENCOUNTER — APPOINTMENT (OUTPATIENT)
Dept: PAIN MANAGEMENT | Facility: CLINIC | Age: 75
End: 2020-11-03
Payer: MEDICARE

## 2020-11-03 VITALS
HEIGHT: 66 IN | BODY MASS INDEX: 30.53 KG/M2 | WEIGHT: 190 LBS | SYSTOLIC BLOOD PRESSURE: 140 MMHG | DIASTOLIC BLOOD PRESSURE: 80 MMHG

## 2020-11-03 PROCEDURE — 99072 ADDL SUPL MATRL&STAF TM PHE: CPT

## 2020-11-03 PROCEDURE — 95971 ALYS SMPL SP/PN NPGT W/PRGRM: CPT

## 2020-11-03 PROCEDURE — 99214 OFFICE O/P EST MOD 30 MIN: CPT | Mod: 25

## 2020-11-03 RX ORDER — BUPROPION HYDROCHLORIDE 150 MG/1
150 TABLET, EXTENDED RELEASE ORAL
Qty: 90 | Refills: 0 | Status: ACTIVE | COMMUNITY
Start: 2020-07-23

## 2020-11-03 RX ORDER — BUPROPION HYDROCHLORIDE 300 MG/1
300 TABLET, EXTENDED RELEASE ORAL
Qty: 90 | Refills: 0 | Status: ACTIVE | COMMUNITY
Start: 2020-07-23

## 2020-11-03 RX ORDER — FLUTICASONE PROPIONATE 50 UG/1
50 SPRAY, METERED NASAL
Qty: 16 | Refills: 0 | Status: ACTIVE | COMMUNITY
Start: 2019-10-14

## 2020-11-03 RX ORDER — VILAZODONE HYDROCHLORIDE 40 MG/1
40 TABLET ORAL
Qty: 30 | Refills: 0 | Status: ACTIVE | COMMUNITY
Start: 2020-03-13

## 2020-11-03 RX ORDER — CLONAZEPAM 0.5 MG/1
0.5 TABLET ORAL
Qty: 30 | Refills: 0 | Status: ACTIVE | COMMUNITY
Start: 2020-10-10

## 2020-11-03 NOTE — ASSESSMENT
[FreeTextEntry1] : back and leg pain likely secondary to lumbar radiculopathy and discogenic pain refractory to conservative treatments in patient with hx of substance use disorder\par \par Right knee pain secondary to arthritis refractory to conservative treatments and steroid injection.  Significantly debilitating to patient. s/p right knee Viscosupplementation injection \par \par Progressive right knee pain secondary to osteoarthritis refractory to conservative treatments and VS, will schedule right knee ZILRETTA  steroid injection r/b/a discussed\par \par informed patient of risks of steroid administration including transient worsening of blood glucose, htn, mood changes, progressive osteoporosis.  Encouraged to call with questions and concerns.\par \par Will schedule above interventional pain procedure because further delay may cause harm or negative outcome to patient.  The goal in performing this procedure is to avoid deterioration of function, emergency room visits (which increases exposure) and reliance on opioids.  \par \par r/b/a discussed with patient, lack of evidence to conclusively determine whether pain management procedures have any positive or negative impact on the possibility of alex the virus and/or development of any sequelae. \par \par Informed patient that risks associated with the COVID-19 infection.  Informed patient steps taken to limit the risks.  We are implementing safety precautions and following protocols consistent with the CDC and state recommendations. All patients and staff will be checked for fever or signs of illness upon entry to the facility. We will limit our steroid dose to the lowest effective therapeutic dose or in some cases steroids will not be injected at all. \par \par Patient agrees to proceed\par \par \par \par \par continue tizanidine prn spasm\par \par persistent pain secondary to lumbosacral stenosis demonstrated on imaging refractory to conservative treatments, s/p caudal epidural steroid injection with improvement in radicular pain\par \par SCS device interrogated and reprogrammed today - scanned into chart\par \par Significant component of axial back pain secondary to lumbar spondylosis and facet arthropathy demonstrated on MRI LS.  Pain refractory to conservative treatments.  s/p BILATERAL L3-sacral ala diagnostic medial branch block \par \par Given significant relief from diagnostic lumbar medial branch block of >80%, and continued lumbar facet arthropathy pain and axial back pain,s/p LEFT then RIGHT  L3-sacral ala medial branch radiofrequency ablation \par Progressive right knee pain secondary to osteoarthritis refractory to conservative treatments, s/p  right knee steroid injection with moderate improvement in knee pain\par \par restart PT - referral provided\par \par patient should consult with Dr. Bass regarding increasing gabapentin to 600mg TID\par \par The above diagnosis and treatment plan is medically reasonable and necessary based on the patient encounter.\par \par There were no barriers to communication.\par Informed patient that I would be available for any additional questions.\par \par Again reinforced that chronic opioid use for chronic msk pain is not the answer and can be dangerous in patient w hx of substance use disorder\par \par Patient was instructed to call with any worsening symptoms including severe pain, new numbness/weakness, or changes in the bowel/bladder function. \par Regarding opiate medication to manage pain. I had a detailed discussion with the patient regarding the risks of long-term opioid use, including the potential for medication side effects, hyperaglesia, endocrine dysfunction, addiction, tolerance, constipation, among others. Discussed relevant risks. \par \par \par Instructed patient to maintain pain diary to monitor pain level, mobility, and function.\par \par \par

## 2020-11-03 NOTE — REASON FOR VISIT
Patient arrives c/o generalized pain since Tuesday. States headache started today with pain behind both eyes. States pain in R eye is worse. States took aspirin without relief. States eating and drinking normal. Denies N/V/D or dysuria.    [Initial Visit] : an initial pain management visit [FreeTextEntry2] : back pain

## 2020-11-03 NOTE — HISTORY OF PRESENT ILLNESS
[Back Pain] : back pain [Neck Pain] : neck pain [Joint Pain] : joint  [10] : a maximum pain level of 10/10 [Sharp] : sharp [Aching] : aching [Standing] : standing [Walking] : walking [Bending] : bending [Sitting] : sitting [FreeTextEntry1] : Interval Note:\par \par Patient returns complaining of right knee pain.  Pain is worse with ambulation.  States the VS injection with helpful for a period of time but continues to have knee pain.   Pain is so bad that patient finds it difficult to perform adls and ambulate. denies any worsening numbness, weakness, bowel/bladder dysfunction\par Also complains of back and leg pain.  She is unsure if scs device is holding a charge.   \par \par \par \par HPI\par \par Ms. SARAHI MARTINEZ is a 74 year F with PPHx of mood disorder followed by Dr. Foster, opioid use disorder previously suboxone SL 8-2mg and SL 2-0.5mg but has since discontinued, PMHx of spinal stenosis, HTN, s/p left femoral prosthesis, [employer] Scs device placed by Dr. Muniz.  Presents today with many years of bilateral lower back pain.  Patient states that the pain is worse with transition, axial, and prevents her from being able to tolerate ALDs.  It is even worse now that she is weaned from suboxone.  denies any worsening numbness, weakness, bowel/bladder dysfunction\par \par \par Previous and current pain medications/doses/effects:\par \par Previous Pain Treatments:\par \par Previous Pain Injections:\par LEFT then RIGHT  L3-sacral ala medial branch radiofrequency ablation 8/2020\par  right knee Viscosupplementation injection completed 9/16/2020\par  right knee steroid injection 7/17/2020\par BILATERAL L3-sacral ala diagnostic medial branch block 7/16/2020\par  caudal epidural steroid injection 6/11/2020\par \par Previous Diagnostic Studies/Images:\par \par XR R knee 7/2020\par \par Severe degenerative arthritis of the right knee\par \par CT LS 5/2020\par \par There is sacralization of the L5 vertebral body.  The lumbar lordosis is preserved.  The\par vertebral body heights joint are maintained. There is mild disc space narrowing at at L1-L2.  A\par spinal stimulator is noted with the leads entering the thecal sac at the  T11-12 level.  The pre-\par vertebral soft tissues are unremarkable.\par \par  At the L1/2 level, there is a disc bulge without significant spinal canal stenosis or\par neuroforaminal narrowing..\par \par  At the L2/3 level, there is a disc bulge with facet and ligamentum flavum hypertrophy resulting in\par up to moderate spinal canal stenosis with mild to moderate bilateral neural foraminal narrowing..\par \par  At the L3/4 level, there is a disc bulge with facet and ligamentum flavum hypertrophy resulting in\par mild spinal canal stenosis with moderate bilateral neural foraminal narrowing.\par \par  At the L4/5 level, there is a disc bulge with facet and ligamentum flavum hypertrophy resulting in\par mild spinal canal stenosis with moderate to severe bilateral neural foraminal narrowing and possible\par impingement on the exiting nerve roots.\par \par  At the L5/S1 level, there is no disc herniation. There is no spinal canal stenosis or neural\par foramen narrowing.\par \par \par  IMPRESSION:\par \par  L4-5 degenerative changes resulting in mild spinal canal stenosis with moderate to severe bilateral\par neural foraminal narrowing and possible impingement on the exiting nerve root.\par \par  L3-4 degenerative changes resulting in moderate bilateral neural foraminal narrowing.\par  [FreeTextEntry2] : 24

## 2020-11-03 NOTE — PHYSICAL EXAM
[Normal muscle bulk without asymmetry] : normal muscle bulk without asymmetry [Facet Tenderness] : facet tenderness [] : Motor: [NL] : normal and symmetric bilaterally [Normal] : Normal affect [de-identified] : right knee TTP \par pain on valgus and varus strain\par

## 2020-11-10 ENCOUNTER — RESULT REVIEW (OUTPATIENT)
Age: 75
End: 2020-11-10

## 2020-11-12 ENCOUNTER — APPOINTMENT (OUTPATIENT)
Dept: PAIN MANAGEMENT | Facility: HOSPITAL | Age: 75
End: 2020-11-12

## 2021-01-11 ENCOUNTER — NON-APPOINTMENT (OUTPATIENT)
Age: 76
End: 2021-01-11

## 2021-01-13 ENCOUNTER — RX RENEWAL (OUTPATIENT)
Age: 76
End: 2021-01-13

## 2021-01-25 ENCOUNTER — RX RENEWAL (OUTPATIENT)
Age: 76
End: 2021-01-25

## 2021-02-08 ENCOUNTER — RX RENEWAL (OUTPATIENT)
Age: 76
End: 2021-02-08

## 2021-03-01 ENCOUNTER — RX RENEWAL (OUTPATIENT)
Age: 76
End: 2021-03-01

## 2021-03-26 ENCOUNTER — APPOINTMENT (OUTPATIENT)
Dept: PAIN MANAGEMENT | Facility: CLINIC | Age: 76
End: 2021-03-26
Payer: MEDICARE

## 2021-03-26 VITALS
HEIGHT: 66 IN | DIASTOLIC BLOOD PRESSURE: 82 MMHG | TEMPERATURE: 98.2 F | SYSTOLIC BLOOD PRESSURE: 130 MMHG | WEIGHT: 190 LBS | BODY MASS INDEX: 30.53 KG/M2

## 2021-03-26 PROCEDURE — 95971 ALYS SMPL SP/PN NPGT W/PRGRM: CPT

## 2021-03-26 PROCEDURE — 99214 OFFICE O/P EST MOD 30 MIN: CPT | Mod: 25

## 2021-03-26 PROCEDURE — 99072 ADDL SUPL MATRL&STAF TM PHE: CPT

## 2021-03-26 NOTE — ASSESSMENT
[FreeTextEntry1] : back and leg pain likely secondary to lumbar radiculopathy and discogenic pain refractory to conservative treatments in patient with hx of substance use disorder\par \par Right knee pain secondary to arthritis refractory to conservative treatments and steroid injection.  Significantly debilitating to patient. s/p right knee Viscosupplementation injection \par \par Progressive right knee pain secondary to osteoarthritis refractory to conservative treatments and VS, s/p  right knee ZILRETTA  steroid injection \par \par severe knee pain secondary to osteoarthritis refractory to multiple conservative treatments as well as interventions.  Patient poor candidate for knee surgery and at this point, she is unwilling to consider surgical intervention.  I will schedule RIGHT knee diagnostic genicular nerve block r/b/a discussed\par \par may consider LEFT knee genicular radiofrequency ablation\par \par Will schedule above interventional pain procedure because further delay may cause harm or negative outcome to patient.  The goal in performing this procedure is to avoid deterioration of function, emergency room visits (which increases exposure) and reliance on opioids.  \par \par r/b/a discussed with patient, lack of evidence to conclusively determine whether pain management procedures have any positive or negative impact on the possibility of alex the virus and/or development of any sequelae. \par \par Patient counselled regarding timing steroid based intervention 2 weeks before or after COVID-19 vaccine administration to avoid any interaction or affect on efficacy of vaccination\par \par Patient demonstrates understanding\par \par Informed patient that risks associated with the COVID-19 infection.  Informed patient steps taken to limit the risks.  We are implementing safety precautions and following protocols consistent with the CDC and state recommendations. All patients and staff will be checked for fever or signs of illness upon entry to the facility. We will limit our steroid dose to the lowest effective therapeutic dose or in some cases steroids will not be injected at all. \par \par Patient agrees to proceed\par \par \par \par continue tizanidine prn spasm\par \par persistent pain secondary to lumbosacral stenosis demonstrated on imaging refractory to conservative treatments, s/p caudal epidural steroid injection with improvement in radicular pain\par \par SCS device interrogated and reprogrammed today - scanned into chart\par \par Significant component of axial back pain secondary to lumbar spondylosis and facet arthropathy demonstrated on MRI LS.  Pain refractory to conservative treatments.  s/p BILATERAL L3-sacral ala diagnostic medial branch block \par \par Given significant relief from diagnostic lumbar medial branch block of >80%, and continued lumbar facet arthropathy pain and axial back pain,s/p LEFT then RIGHT  L3-sacral ala medial branch radiofrequency ablation \par Progressive right knee pain secondary to osteoarthritis refractory to conservative treatments, s/p  right knee steroid injection with moderate improvement in knee pain\par \par patient should consult with Dr. Bass regarding increasing gabapentin to 600mg TID\par \par The above diagnosis and treatment plan is medically reasonable and necessary based on the patient encounter.\par \par There were no barriers to communication.\par Informed patient that I would be available for any additional questions.\par \par Again reinforced that chronic opioid use for chronic msk pain is not the answer and can be dangerous in patient w hx of substance use disorder\par \par Patient was instructed to call with any worsening symptoms including severe pain, new numbness/weakness, or changes in the bowel/bladder function. \par Regarding opiate medication to manage pain. I had a detailed discussion with the patient regarding the risks of long-term opioid use, including the potential for medication side effects, hyperaglesia, endocrine dysfunction, addiction, tolerance, constipation, among others. Discussed relevant risks. \par \par \par Instructed patient to maintain pain diary to monitor pain level, mobility, and function.\par \par \par

## 2021-03-26 NOTE — HISTORY OF PRESENT ILLNESS
[7] : 3. What number best describes how, during the past week, pain has interfered with your general activity? 7/10 pain [Back Pain] : back pain [Neck Pain] : neck pain [Joint Pain] : joint  [10] : a maximum pain level of 10/10 [Sharp] : sharp [Aching] : aching [Standing] : standing [Walking] : walking [Bending] : bending [Sitting] : sitting [FreeTextEntry1] : Interval Note:\par \par Patient returns complaining of right knee pain.  Pain is worse with ambulation. Had improvement after zilretta steroid injection.  Pain is so bad that patient finds it difficult to perform adls and ambulate. denies any worsening numbness, weakness, bowel/bladder dysfunction.  She also complains of right shoulder pain when she lifts objects.\par \par \par \par \par HPI\par \par Ms. SARAHI MARTINEZ is a 75 year F with PPHx of mood disorder followed by Dr. Foster, opioid use disorder previously suboxone SL 8-2mg and SL 2-0.5mg but has since discontinued, PMHx of spinal stenosis, HTN, s/p left femoral prosthesis, [employer] Scs device placed by Dr. Muniz.  Presents today with many years of bilateral lower back pain.  Patient states that the pain is worse with transition, axial, and prevents her from being able to tolerate ALDs.  It is even worse now that she is weaned from suboxone.  denies any worsening numbness, weakness, bowel/bladder dysfunction\par \par \par Previous and current pain medications/doses/effects:\par \par Previous Pain Treatments:\par \par Previous Pain Injections:\par \par right knee ZILRETTA  steroid injection 11/12/20\par LEFT then RIGHT  L3-sacral ala medial branch radiofrequency ablation 8/2020\par  right knee Viscosupplementation injection completed 9/16/2020\par  right knee steroid injection 7/17/2020\par BILATERAL L3-sacral ala diagnostic medial branch block 7/16/2020\par  caudal epidural steroid injection 6/11/2020\par \par Previous Diagnostic Studies/Images:\par \par XR R knee 7/2020\par \par Severe degenerative arthritis of the right knee\par \par CT LS 5/2020\par \par There is sacralization of the L5 vertebral body.  The lumbar lordosis is preserved.  The\par vertebral body heights joint are maintained. There is mild disc space narrowing at at L1-L2.  A\par spinal stimulator is noted with the leads entering the thecal sac at the  T11-12 level.  The pre-\par vertebral soft tissues are unremarkable.\par \par  At the L1/2 level, there is a disc bulge without significant spinal canal stenosis or\par neuroforaminal narrowing..\par \par  At the L2/3 level, there is a disc bulge with facet and ligamentum flavum hypertrophy resulting in\par up to moderate spinal canal stenosis with mild to moderate bilateral neural foraminal narrowing..\par \par  At the L3/4 level, there is a disc bulge with facet and ligamentum flavum hypertrophy resulting in\par mild spinal canal stenosis with moderate bilateral neural foraminal narrowing.\par \par  At the L4/5 level, there is a disc bulge with facet and ligamentum flavum hypertrophy resulting in\par mild spinal canal stenosis with moderate to severe bilateral neural foraminal narrowing and possible\par impingement on the exiting nerve roots.\par \par  At the L5/S1 level, there is no disc herniation. There is no spinal canal stenosis or neural\par foramen narrowing.\par \par \par  IMPRESSION:\par \par  L4-5 degenerative changes resulting in mild spinal canal stenosis with moderate to severe bilateral\par neural foraminal narrowing and possible impingement on the exiting nerve root.\par \par  L3-4 degenerative changes resulting in moderate bilateral neural foraminal narrowing.\par  [FreeTextEntry2] : 21

## 2021-03-26 NOTE — PHYSICAL EXAM
[Normal muscle bulk without asymmetry] : normal muscle bulk without asymmetry [Within functional limits and without pain] : within functional limits and without pain [Antalgic] : antalgic [Walker] : ambulates with walker [] : Motor: [NL] : normal and symmetric bilaterally [Normal] : Normal affect

## 2021-03-31 ENCOUNTER — RX RENEWAL (OUTPATIENT)
Age: 76
End: 2021-03-31

## 2021-04-09 ENCOUNTER — RESULT REVIEW (OUTPATIENT)
Age: 76
End: 2021-04-09

## 2021-04-12 ENCOUNTER — RX RENEWAL (OUTPATIENT)
Age: 76
End: 2021-04-12

## 2021-04-12 ENCOUNTER — RESULT REVIEW (OUTPATIENT)
Age: 76
End: 2021-04-12

## 2021-04-12 ENCOUNTER — APPOINTMENT (OUTPATIENT)
Dept: PAIN MANAGEMENT | Facility: HOSPITAL | Age: 76
End: 2021-04-12

## 2021-04-13 ENCOUNTER — APPOINTMENT (OUTPATIENT)
Dept: PAIN MANAGEMENT | Facility: CLINIC | Age: 76
End: 2021-04-13
Payer: MEDICARE

## 2021-04-13 PROCEDURE — 99442: CPT

## 2021-04-13 NOTE — ASSESSMENT
[FreeTextEntry1] : back and leg pain likely secondary to lumbar radiculopathy and discogenic pain refractory to conservative treatments in patient with hx of substance use disorder\par \par Right knee pain secondary to arthritis refractory to conservative treatments and steroid injection.  Significantly debilitating to patient. s/p right knee Viscosupplementation injection \par \par Progressive right knee pain secondary to osteoarthritis refractory to conservative treatments and VS, s/p  right knee ZILRETTA  steroid injection \par \par severe knee pain secondary to osteoarthritis refractory to multiple conservative treatments as well as interventions.  Patient poor candidate for knee surgery and at this point, she is unwilling to consider surgical intervention.  s/p  RIGHT knee diagnostic genicular nerve block with 8 hours of 100% improvement\par \par will re-evaluate\par \par may consider RIGHT knee genicular radiofrequency ablation\par \par \par \par continue tizanidine prn spasm\par \par persistent pain secondary to lumbosacral stenosis demonstrated on imaging refractory to conservative treatments, s/p caudal epidural steroid injection with improvement in radicular pain\par \par SCS device interrogated and reprogrammed today - scanned into chart\par \par Significant component of axial back pain secondary to lumbar spondylosis and facet arthropathy demonstrated on MRI LS.  Pain refractory to conservative treatments.  s/p BILATERAL L3-sacral ala diagnostic medial branch block \par \par Given significant relief from diagnostic lumbar medial branch block of >80%, and continued lumbar facet arthropathy pain and axial back pain,s/p LEFT then RIGHT  L3-sacral ala medial branch radiofrequency ablation \par Progressive right knee pain secondary to osteoarthritis refractory to conservative treatments, s/p  right knee steroid injection with moderate improvement in knee pain\par \par patient should consult with Dr. Bass regarding increasing gabapentin to 600mg TID\par \par The above diagnosis and treatment plan is medically reasonable and necessary based on the patient encounter.\par \par There were no barriers to communication.\par Informed patient that I would be available for any additional questions.\par \par Again reinforced that chronic opioid use for chronic msk pain is not the answer and can be dangerous in patient w hx of substance use disorder\par \par Patient was instructed to call with any worsening symptoms including severe pain, new numbness/weakness, or changes in the bowel/bladder function. \par Regarding opiate medication to manage pain. I had a detailed discussion with the patient regarding the risks of long-term opioid use, including the potential for medication side effects, hyperaglesia, endocrine dysfunction, addiction, tolerance, constipation, among others. Discussed relevant risks. \par \par \par Instructed patient to maintain pain diary to monitor pain level, mobility, and function.\par \par I explained to patient benefits and limitation of TeleMedicine visits\par \par Patient understands that limitations include inability to perform comprehensive physical exam, which may lead to potential diagnostic inconsistencies.  \par \par Patient understands that diagnosis and treatment may be limited by these inconsistencies and patient agrees to proceed with care plan\par \par \par \par \par

## 2021-04-13 NOTE — HISTORY OF PRESENT ILLNESS
[Back Pain] : back pain [Neck Pain] : neck pain [Joint Pain] : joint  [10] : a maximum pain level of 10/10 [Sharp] : sharp [Aching] : aching [Standing] : standing [Walking] : walking [Bending] : bending [Sitting] : sitting [Home] : at home, [unfilled] , at the time of the visit. [Medical Office: ([facility])___] : at the medical office located in  [Verbal consent obtained from patient] : the patient, [unfilled] [FreeTextEntry1] : Interval Note:\par \par RIGHT knee diagnostic genicular nerve block 4/12/21 for 8 hours she had no pain in her right knee, 100% improvement.  Pain returned  in medial portion this AM. \par \par \par \par HPI\par \par Ms. SARAHI MARTINEZ is a 75 year F with PPHx of mood disorder followed by Dr. Foster, opioid use disorder previously suboxone SL 8-2mg and SL 2-0.5mg but has since discontinued, PMHx of spinal stenosis, HTN, s/p left femoral prosthesis, [employer] Scs device placed by Dr. Muniz.  Presents today with many years of bilateral lower back pain.  Patient states that the pain is worse with transition, axial, and prevents her from being able to tolerate ALDs.  It is even worse now that she is weaned from suboxone.  denies any worsening numbness, weakness, bowel/bladder dysfunction\par \par \par Previous and current pain medications/doses/effects:\par \par Previous Pain Treatments:\par \par Previous Pain Injections:\par \par right knee ZILRETTA  steroid injection 11/12/20\par LEFT then RIGHT  L3-sacral ala medial branch radiofrequency ablation 8/2020\par  right knee Viscosupplementation injection completed 9/16/2020\par  right knee steroid injection 7/17/2020\par BILATERAL L3-sacral ala diagnostic medial branch block 7/16/2020\par  caudal epidural steroid injection 6/11/2020\par \par Previous Diagnostic Studies/Images:\par \par XR R knee 7/2020\par \par Severe degenerative arthritis of the right knee\par \par CT LS 5/2020\par \par There is sacralization of the L5 vertebral body.  The lumbar lordosis is preserved.  The\par vertebral body heights joint are maintained. There is mild disc space narrowing at at L1-L2.  A\par spinal stimulator is noted with the leads entering the thecal sac at the  T11-12 level.  The pre-\par vertebral soft tissues are unremarkable.\par \par  At the L1/2 level, there is a disc bulge without significant spinal canal stenosis or\par neuroforaminal narrowing..\par \par  At the L2/3 level, there is a disc bulge with facet and ligamentum flavum hypertrophy resulting in\par up to moderate spinal canal stenosis with mild to moderate bilateral neural foraminal narrowing..\par \par  At the L3/4 level, there is a disc bulge with facet and ligamentum flavum hypertrophy resulting in\par mild spinal canal stenosis with moderate bilateral neural foraminal narrowing.\par \par  At the L4/5 level, there is a disc bulge with facet and ligamentum flavum hypertrophy resulting in\par mild spinal canal stenosis with moderate to severe bilateral neural foraminal narrowing and possible\par impingement on the exiting nerve roots.\par \par  At the L5/S1 level, there is no disc herniation. There is no spinal canal stenosis or neural\par foramen narrowing.\par \par \par  IMPRESSION:\par \par  L4-5 degenerative changes resulting in mild spinal canal stenosis with moderate to severe bilateral\par neural foraminal narrowing and possible impingement on the exiting nerve root.\par \par  L3-4 degenerative changes resulting in moderate bilateral neural foraminal narrowing.\par

## 2021-04-30 ENCOUNTER — APPOINTMENT (OUTPATIENT)
Dept: PAIN MANAGEMENT | Facility: CLINIC | Age: 76
End: 2021-04-30
Payer: MEDICARE

## 2021-04-30 VITALS
WEIGHT: 190 LBS | HEIGHT: 66 IN | DIASTOLIC BLOOD PRESSURE: 62 MMHG | BODY MASS INDEX: 30.53 KG/M2 | TEMPERATURE: 98.2 F | SYSTOLIC BLOOD PRESSURE: 100 MMHG

## 2021-04-30 PROCEDURE — 99214 OFFICE O/P EST MOD 30 MIN: CPT

## 2021-04-30 PROCEDURE — 99072 ADDL SUPL MATRL&STAF TM PHE: CPT

## 2021-04-30 NOTE — ASSESSMENT
[FreeTextEntry1] : back and leg pain likely secondary to lumbar radiculopathy and discogenic pain refractory to conservative treatments in patient with hx of substance use disorder\par \par Right knee pain secondary to arthritis refractory to conservative treatments and steroid injection.  Significantly debilitating to patient. s/p right knee Viscosupplementation injection \par \par Progressive right knee pain secondary to osteoarthritis refractory to conservative treatments and VS, s/p  right knee ZILRETTA  steroid injection \par \par severe knee pain secondary to osteoarthritis refractory to multiple conservative treatments as well as interventions.  Patient poor candidate for knee surgery and at this point, she is unwilling to consider surgical intervention.  s/p  RIGHT knee diagnostic genicular nerve block with 8 hours of 100% improvement\par \par Patient experienced 80% relief of knee pain for 2 days after genicular nerve block with improvement in ambulation and ability to perform adls.  Given significant relief, will schedule RIGHT Knee genicular nerve ABLATION at 80C for 2:30 min under fluoroscopic guidance and moderate sedation. R/b/a discussed\par \par \par Will schedule above interventional pain procedure because further delay may cause harm or negative outcome to patient.  The goal in performing this procedure is to avoid deterioration of function, emergency room visits (which increases exposure) and reliance on opioids.  \par \par r/b/a discussed with patient, lack of evidence to conclusively determine whether pain management procedures have any positive or negative impact on the possibility of alex the virus and/or development of any sequelae. \par \par Patient counselled regarding timing steroid based intervention 2 weeks before or after COVID-19 vaccine administration to avoid any interaction or affect on efficacy of vaccination\par \par Patient demonstrates understanding\par \par Informed patient that risks associated with the COVID-19 infection.  Informed patient steps taken to limit the risks.  We are implementing safety precautions and following protocols consistent with the CDC and state recommendations. All patients and staff will be checked for fever or signs of illness upon entry to the facility. We will limit our steroid dose to the lowest effective therapeutic dose or in some cases steroids will not be injected at all. \par \par Patient agrees to proceed\par \par Start PT for lumbar spondylosis and myofascial pain\par \par continue tizanidine prn spasm\par \par persistent pain secondary to lumbosacral stenosis demonstrated on imaging refractory to conservative treatments, s/p caudal epidural steroid injection with improvement in radicular pain\par \par SCS device interrogated and reprogrammed today - scanned into chart\par \par Significant component of axial back pain secondary to lumbar spondylosis and facet arthropathy demonstrated on MRI LS.  Pain refractory to conservative treatments.  s/p BILATERAL L3-sacral ala diagnostic medial branch block \par \par Given significant relief from diagnostic lumbar medial branch block of >80%, and continued lumbar facet arthropathy pain and axial back pain,s/p LEFT then RIGHT  L3-sacral ala medial branch radiofrequency ablation \par Progressive right knee pain secondary to osteoarthritis refractory to conservative treatments, s/p  right knee steroid injection with moderate improvement in knee pain\par \par patient should consult with Dr. Bass regarding increasing gabapentin to 600mg TID\par \par The above diagnosis and treatment plan is medically reasonable and necessary based on the patient encounter.\par \par There were no barriers to communication.\par Informed patient that I would be available for any additional questions.\par \par Again reinforced that chronic opioid use for chronic msk pain is not the answer and can be dangerous in patient w hx of substance use disorder\par \par Patient was instructed to call with any worsening symptoms including severe pain, new numbness/weakness, or changes in the bowel/bladder function. \par Regarding opiate medication to manage pain. I had a detailed discussion with the patient regarding the risks of long-term opioid use, including the potential for medication side effects, hyperaglesia, endocrine dysfunction, addiction, tolerance, constipation, among others. Discussed relevant risks. \par \par \par \par \par \par

## 2021-04-30 NOTE — HISTORY OF PRESENT ILLNESS
[10 (pain as bad as you can imagine)] : 1. What number best describes your pain on average in the past week? 10/10 pain [10 (completely interferes)] : 3. What number best describes how, during the past week, pain has interfered with your general activity? 10/10 pain [Back Pain] : back pain [Neck Pain] : neck pain [Joint Pain] : joint  [10] : a maximum pain level of 10/10 [Sharp] : sharp [Aching] : aching [Standing] : standing [Walking] : walking [Bending] : bending [Sitting] : sitting [FreeTextEntry1] : Interval Note:\par \par RIGHT knee diagnostic genicular nerve block 4/12/21 for 8 hours she had no pain in her right knee, 100% improvement.  Patient reports that knee pain has returned.  In addition she complains of bilateral lower back pain, when she stands and ambulates.   Pain is so bad that patient finds it difficult to perform adls and ambulate. denies any worsening numbness, weakness, bowel/bladder dysfunction\par \par \par \par HPI\par \par Ms. SARAHI MARTINEZ is a 75 year F with PPHx of mood disorder followed by Dr. Foster, opioid use disorder previously suboxone SL 8-2mg and SL 2-0.5mg but has since discontinued, PMHx of spinal stenosis, HTN, s/p left femoral prosthesis, [employer] Scs device placed by Dr. Muniz.  Presents today with many years of bilateral lower back pain.  Patient states that the pain is worse with transition, axial, and prevents her from being able to tolerate ALDs.  It is even worse now that she is weaned from suboxone.  denies any worsening numbness, weakness, bowel/bladder dysfunction\par \par \par Previous and current pain medications/doses/effects:\par \par Previous Pain Treatments:\par \par Previous Pain Injections:\par \par right knee ZILRETTA  steroid injection 11/12/20\par LEFT then RIGHT  L3-sacral ala medial branch radiofrequency ablation 8/2020\par  right knee Viscosupplementation injection completed 9/16/2020\par  right knee steroid injection 7/17/2020\par BILATERAL L3-sacral ala diagnostic medial branch block 7/16/2020\par  caudal epidural steroid injection 6/11/2020\par \par Previous Diagnostic Studies/Images:\par \par XR R knee 7/2020\par \par Severe degenerative arthritis of the right knee\par \par CT LS 5/2020\par \par There is sacralization of the L5 vertebral body.  The lumbar lordosis is preserved.  The\par vertebral body heights joint are maintained. There is mild disc space narrowing at at L1-L2.  A\par spinal stimulator is noted with the leads entering the thecal sac at the  T11-12 level.  The pre-\par vertebral soft tissues are unremarkable.\par \par  At the L1/2 level, there is a disc bulge without significant spinal canal stenosis or\par neuroforaminal narrowing..\par \par  At the L2/3 level, there is a disc bulge with facet and ligamentum flavum hypertrophy resulting in\par up to moderate spinal canal stenosis with mild to moderate bilateral neural foraminal narrowing..\par \par  At the L3/4 level, there is a disc bulge with facet and ligamentum flavum hypertrophy resulting in\par mild spinal canal stenosis with moderate bilateral neural foraminal narrowing.\par \par  At the L4/5 level, there is a disc bulge with facet and ligamentum flavum hypertrophy resulting in\par mild spinal canal stenosis with moderate to severe bilateral neural foraminal narrowing and possible\par impingement on the exiting nerve roots.\par \par  At the L5/S1 level, there is no disc herniation. There is no spinal canal stenosis or neural\par foramen narrowing.\par \par \par  IMPRESSION:\par \par  L4-5 degenerative changes resulting in mild spinal canal stenosis with moderate to severe bilateral\par neural foraminal narrowing and possible impingement on the exiting nerve root.\par \par  L3-4 degenerative changes resulting in moderate bilateral neural foraminal narrowing.\par  [FreeTextEntry2] : 30

## 2021-04-30 NOTE — PHYSICAL EXAM
[Normal muscle bulk without asymmetry] : normal muscle bulk without asymmetry [Facet Tenderness] : facet tenderness [Walker] : ambulates with walker [] : Motor: [NL] : normal and symmetric bilaterally [Normal] : Normal affect

## 2021-05-10 ENCOUNTER — RX RENEWAL (OUTPATIENT)
Age: 76
End: 2021-05-10

## 2021-05-10 ENCOUNTER — RESULT REVIEW (OUTPATIENT)
Age: 76
End: 2021-05-10

## 2021-05-13 ENCOUNTER — APPOINTMENT (OUTPATIENT)
Dept: PAIN MANAGEMENT | Facility: HOSPITAL | Age: 76
End: 2021-05-13

## 2021-05-13 ENCOUNTER — RESULT REVIEW (OUTPATIENT)
Age: 76
End: 2021-05-13

## 2021-07-07 ENCOUNTER — APPOINTMENT (OUTPATIENT)
Dept: INTERNAL MEDICINE | Facility: CLINIC | Age: 76
End: 2021-07-07
Payer: MEDICARE

## 2021-07-07 VITALS
TEMPERATURE: 98.5 F | BODY MASS INDEX: 35.52 KG/M2 | SYSTOLIC BLOOD PRESSURE: 134 MMHG | WEIGHT: 221 LBS | DIASTOLIC BLOOD PRESSURE: 76 MMHG | HEIGHT: 66 IN | HEART RATE: 95 BPM | OXYGEN SATURATION: 90 %
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
## 2021-07-07 DIAGNOSIS — F17.200 NICOTINE DEPENDENCE, UNSPECIFIED, UNCOMPLICATED: ICD-10-CM

## 2021-07-07 PROCEDURE — 99203 OFFICE O/P NEW LOW 30 MIN: CPT

## 2021-07-07 PROCEDURE — 99072 ADDL SUPL MATRL&STAF TM PHE: CPT

## 2021-07-07 RX ORDER — PRAVASTATIN SODIUM 80 MG/1
80 TABLET ORAL
Refills: 0 | Status: DISCONTINUED | COMMUNITY
End: 2021-07-07

## 2021-07-07 RX ORDER — FLUOXETINE HYDROCHLORIDE 40 MG/1
40 CAPSULE ORAL
Refills: 0 | Status: DISCONTINUED | COMMUNITY
End: 2021-07-07

## 2021-07-07 RX ORDER — ATENOLOL 25 MG/1
25 TABLET ORAL
Refills: 0 | Status: DISCONTINUED | COMMUNITY
End: 2021-07-07

## 2021-07-07 RX ORDER — BUPROPION HYDROCHLORIDE 150 MG/1
150 TABLET, FILM COATED, EXTENDED RELEASE ORAL
Refills: 0 | Status: DISCONTINUED | COMMUNITY
End: 2021-07-07

## 2021-07-07 NOTE — HEALTH RISK ASSESSMENT
[] : Yes [10-14] : 10-14 [No] : No [0] : 1) Little interest or pleasure doing things: Not at all (0) [1] : 2) Feeling down, depressed, or hopeless for several days (1) [PHQ-2 Negative - No further assessment needed] : PHQ-2 Negative - No further assessment needed [[id number]] : 1

## 2021-07-07 NOTE — PHYSICAL EXAM
[Obese] : patient was observed to be obese [Normal] : normal gait, coordination grossly intact, no focal deficits [de-identified] : MIGUELINA knees
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
## 2021-07-07 NOTE — REVIEW OF SYSTEMS
[Earache] : no earache [Nasal Discharge] : no nasal discharge [Sore Throat] : no sore throat [Joint Pain] : joint pain [Back Pain] : back pain [Negative] : Neurological [FreeTextEntry4] : dry mouth [FreeTextEntry9] : old

## 2021-07-08 RX ORDER — ROSUVASTATIN CALCIUM 40 MG/1
40 TABLET, FILM COATED ORAL DAILY
Refills: 0 | Status: ACTIVE | COMMUNITY
Start: 2021-07-08

## 2021-07-16 ENCOUNTER — APPOINTMENT (OUTPATIENT)
Dept: PAIN MANAGEMENT | Facility: CLINIC | Age: 76
End: 2021-07-16
Payer: MEDICARE

## 2021-07-16 VITALS
TEMPERATURE: 98 F | WEIGHT: 221 LBS | HEIGHT: 66 IN | BODY MASS INDEX: 35.52 KG/M2 | DIASTOLIC BLOOD PRESSURE: 82 MMHG | SYSTOLIC BLOOD PRESSURE: 126 MMHG

## 2021-07-16 PROCEDURE — 99072 ADDL SUPL MATRL&STAF TM PHE: CPT

## 2021-07-16 PROCEDURE — 99214 OFFICE O/P EST MOD 30 MIN: CPT | Mod: 25

## 2021-07-16 PROCEDURE — 20611 DRAIN/INJ JOINT/BURSA W/US: CPT

## 2021-07-16 RX ADMIN — Medication %: at 00:00

## 2021-07-16 RX ADMIN — METHYLPREDNISOLONE ACETATE 0 MG/ML: 20 INJECTION, SUSPENSION INTRALESIONAL; INTRAMUSCULAR; INTRASYNOVIAL; SOFT TISSUE at 00:00

## 2021-07-16 RX ADMIN — BUPIVACAINE HYDROCHLORIDE %: 5 INJECTION, SOLUTION PERINEURAL at 00:00

## 2021-07-16 NOTE — ASSESSMENT
[FreeTextEntry1] : subacromial bursitis pain refractory to conservative treatments. will schedule right subacromial bursa steroid injection r/b/a discussed consented\par \par informed patient of risks of steroid administration including transient worsening of blood glucose, htn, mood changes, progressive osteoporosis.  Encouraged to call with questions and concerns.\par \par Will schedule above interventional pain procedure because further delay may cause harm or negative outcome to patient.  The goal in performing this procedure is to avoid deterioration of function, emergency room visits (which increases exposure) and reliance on opioids.  \par \par r/b/a discussed with patient, lack of evidence to conclusively determine whether pain management procedures have any positive or negative impact on the possibility of alex the virus and/or development of any sequelae. \par \par Patient counselled regarding timing steroid based intervention 2 weeks before or after COVID-19 vaccine administration to avoid any interaction or affect on efficacy of vaccination\par \par Patient demonstrates understanding\par \par Informed patient that risks associated with the COVID-19 infection.  Informed patient steps taken to limit the risks.  We are implementing safety precautions and following protocols consistent with the CDC and state recommendations. All patients and staff will be checked for fever or signs of illness upon entry to the facility. We will limit our steroid dose to the lowest effective therapeutic dose or in some cases steroids will not be injected at all. \par \par Patient agrees to proceed\par \par \par \par back and leg pain likely secondary to lumbar radiculopathy and discogenic pain refractory to conservative treatments in patient with hx of substance use disorder\par \par Right knee pain secondary to arthritis refractory to conservative treatments and steroid injection.  Significantly debilitating to patient. s/p right knee Viscosupplementation injection \par \par Progressive right knee pain secondary to osteoarthritis refractory to conservative treatments and VS, s/p  right knee ZILRETTA  steroid injection \par \par severe knee pain secondary to osteoarthritis refractory to multiple conservative treatments as well as interventions.  Patient poor candidate for knee surgery and at this point, she is unwilling to consider surgical intervention.  s/p  RIGHT knee diagnostic genicular nerve block with 8 hours of 100% improvement\par \par Patient experienced 80% relief of knee pain for 2 days after genicular nerve block with improvement in ambulation and ability to perform adls.  Given significant relief, sp RIGHT Knee genicular nerve ABLATION\par \par Start PT for lumbar spondylosis and myofascial pain\par \par continue tizanidine prn spasm\par \par persistent pain secondary to lumbosacral stenosis demonstrated on imaging refractory to conservative treatments, s/p caudal epidural steroid injection with improvement in radicular pain\par \par SCS device interrogated and reprogrammed today - scanned into chart\par \par Significant component of axial back pain secondary to lumbar spondylosis and facet arthropathy demonstrated on MRI LS.  Pain refractory to conservative treatments.  s/p BILATERAL L3-sacral ala diagnostic medial branch block \par \par Given significant relief from diagnostic lumbar medial branch block of >80%, and continued lumbar facet arthropathy pain and axial back pain,s/p LEFT then RIGHT  L3-sacral ala medial branch radiofrequency ablation \par \par may consider repeat ablatoin\par \par patient should consult with Dr. Bass regarding increasing gabapentin to 600mg TID\par \par The above diagnosis and treatment plan is medically reasonable and necessary based on the patient encounter.\par \par There were no barriers to communication.\par Informed patient that I would be available for any additional questions.\par \par Again reinforced that chronic opioid use for chronic msk pain is not the answer and can be dangerous in patient w hx of substance use disorder\par \par Patient was instructed to call with any worsening symptoms including severe pain, new numbness/weakness, or changes in the bowel/bladder function. \par Regarding opiate medication to manage pain. I had a detailed discussion with the patient regarding the risks of long-term opioid use, including the potential for medication side effects, hyperaglesia, endocrine dysfunction, addiction, tolerance, constipation, among others. Discussed relevant risks. \par \par \par \par PROCEDURE NOTE\par \par RIGHT SUBACROMIAL BURSA INJECTION\par \par The patient was given opportunity to ask questions regarding the procedure, its indications and the associated risks.  The risks of the procedure discussed include but are not limited to infection, bleeding, allergic reactions, nerve injuries, and side effects.  The patient showed understanding and wished to proceed.\par \par After obtaining informed consent, the patient's chart was reviewed, the site of operation was marked, and the patient's identification was confirmed.  The patient was brought to the Exam Room and placed in the seated position on the exam room table with the right  arm behind the back with the elbow bent. Strict sterile technique was used.  Skin was prepped with chloroprep solution.\par \par Using sterile technique, a high-frequency, linear-array ultrasound probe was placed in a transverse position along the superior lateral aspect of the shoulder. The humeral head, overlying supraspinatus tendon, and deltoid muscle were visualized.  The bursa was identified between the supraspinatus tendon and deltoid muscle.  Using a posterior approach, an entry point just posterior to the ultrasound probe was anesthetized using a [30]g needle and [1]cc 1% lidocaine.  Following this, a [25]g needle was inserted using an in-plane technique with the ultrasound such that the needle shaft and tip were visualized.  When the needle tip entered the bursa, a mixture of 20mg depomedrol with 5cc 0.25% Bupivacaine was injected. The needle was then flushed with lidocaine and removed.  A band-aid dressing was applied.\par \par The patient tolerated the procedure well.  Vital signs remained stable throughout and there were no immediate adverse events. The patient was asked to follow up in 2 weeks and was instructed to call with fever, chills, increased pain, redness or swelling at the injection site, numbness or weakness.\par \par \par
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
## 2021-08-20 ENCOUNTER — APPOINTMENT (OUTPATIENT)
Dept: PAIN MANAGEMENT | Facility: CLINIC | Age: 76
End: 2021-08-20
Payer: MEDICARE

## 2021-08-20 VITALS
SYSTOLIC BLOOD PRESSURE: 105 MMHG | HEIGHT: 66 IN | DIASTOLIC BLOOD PRESSURE: 70 MMHG | WEIGHT: 221 LBS | TEMPERATURE: 98 F | BODY MASS INDEX: 35.52 KG/M2

## 2021-08-20 PROCEDURE — 99214 OFFICE O/P EST MOD 30 MIN: CPT | Mod: 25

## 2021-08-20 PROCEDURE — 95971 ALYS SMPL SP/PN NPGT W/PRGRM: CPT

## 2021-08-20 NOTE — HISTORY OF PRESENT ILLNESS
[10 (pain as bad as you can imagine)] : 1. What number best describes your pain on average in the past week? 10/10 pain [10 (completely interferes)] : 3. What number best describes how, during the past week, pain has interfered with your general activity? 10/10 pain [Back Pain] : back pain [Neck Pain] : neck pain [Joint Pain] : joint  [10] : a maximum pain level of 10/10 [Sharp] : sharp [Aching] : aching [Standing] : standing [Walking] : walking [Bending] : bending [Sitting] : sitting [FreeTextEntry1] : Interval Note:\par sp  right subacromial bursa steroid injection 7/16/21 with transient improvement in right shoulder pain\par \par Patient reports that she bilateral lower back pain, axial, worse with transition.\par \par Reports right knee pain.\par \par  Pain is so bad that patient finds it difficult to perform adls and ambulate. denies any worsening numbness, weakness, bowel/bladder dysfunction\par \par \par \par HPI\par \par Ms. SARAHI MARTINEZ is a 75 year F with PPHx of mood disorder followed by Dr. Foster, opioid use disorder previously suboxone SL 8-2mg and SL 2-0.5mg but has since discontinued, PMHx of spinal stenosis, HTN, s/p left femoral prosthesis, [employer] Scs device placed by Dr. Muniz.  Presents today with many years of bilateral lower back pain.  Patient states that the pain is worse with transition, axial, and prevents her from being able to tolerate ALDs.  It is even worse now that she is weaned from suboxone.  denies any worsening numbness, weakness, bowel/bladder dysfunction\par \par \par Previous and current pain medications/doses/effects:\par \par Previous Pain Treatments:\par \par Previous Pain Injections:\par \par  right subacromial bursa steroid injection 7/16/21\par  RIGHT Knee genicular nerve ABLATION 5/13/21\par right knee ZILRETTA  steroid injection 11/12/20\par LEFT then RIGHT  L3-sacral ala medial branch radiofrequency ablation 8/2020\par  right knee Viscosupplementation injection completed 9/16/2020\par  right knee steroid injection 7/17/2020\par BILATERAL L3-sacral ala diagnostic medial branch block 7/16/2020\par  caudal epidural steroid injection 6/11/2020\par \par Previous Diagnostic Studies/Images:\par \par XR R knee 7/2020\par \par Severe degenerative arthritis of the right knee\par \par CT LS 5/2020\par \par There is sacralization of the L5 vertebral body.  The lumbar lordosis is preserved.  The\par vertebral body heights joint are maintained. There is mild disc space narrowing at at L1-L2.  A\par spinal stimulator is noted with the leads entering the thecal sac at the  T11-12 level.  The pre-\par vertebral soft tissues are unremarkable.\par \par  At the L1/2 level, there is a disc bulge without significant spinal canal stenosis or\par neuroforaminal narrowing..\par \par  At the L2/3 level, there is a disc bulge with facet and ligamentum flavum hypertrophy resulting in\par up to moderate spinal canal stenosis with mild to moderate bilateral neural foraminal narrowing..\par \par  At the L3/4 level, there is a disc bulge with facet and ligamentum flavum hypertrophy resulting in\par mild spinal canal stenosis with moderate bilateral neural foraminal narrowing.\par \par  At the L4/5 level, there is a disc bulge with facet and ligamentum flavum hypertrophy resulting in\par mild spinal canal stenosis with moderate to severe bilateral neural foraminal narrowing and possible\par impingement on the exiting nerve roots.\par \par  At the L5/S1 level, there is no disc herniation. There is no spinal canal stenosis or neural\par foramen narrowing.\par \par \par  IMPRESSION:\par \par  L4-5 degenerative changes resulting in mild spinal canal stenosis with moderate to severe bilateral\par neural foraminal narrowing and possible impingement on the exiting nerve root.\par \par  L3-4 degenerative changes resulting in moderate bilateral neural foraminal narrowing.\par  [FreeTextEntry2] : 30

## 2021-08-20 NOTE — PHYSICAL EXAM
[Normal muscle bulk without asymmetry] : normal muscle bulk without asymmetry [Facet Tenderness] : facet tenderness [Walker] : ambulates with walker [Normal] : Normal affect

## 2021-08-20 NOTE — ASSESSMENT
[FreeTextEntry1] : SCS reprogramming today to improve coverage of pain - scanned into chart\par \par subacromial bursitis pain refractory to conservative treatments. sp right subacromial bursa steroid injection r/b/a discussed\par \par \par back and leg pain likely secondary to lumbar radiculopathy and discogenic pain refractory to conservative treatments in patient with hx of substance use disorder\par \par Right knee pain secondary to arthritis refractory to conservative treatments and steroid injection.  Significantly debilitating to patient. s/p right knee Viscosupplementation injection \par \par Progressive right knee pain secondary to osteoarthritis refractory to conservative treatments and VS, s/p  right knee ZILRETTA  steroid injection \par \par given efficacy of previous intervention that is >30% improvement in pain and improved ability to perform adls, and return of pain despite conservative treatment, will schedule repeat right knee ZILRETTA  steroid injection r/b/a discussed\par \par informed patient of risks of steroid administration including transient worsening of blood glucose, htn, mood changes, progressive osteoporosis.  Encouraged to call with questions and concerns.\par \par trial robaxin prn spasm/pain\par \par severe knee pain secondary to osteoarthritis refractory to multiple conservative treatments as well as interventions.  Patient poor candidate for knee surgery and at this point, she is unwilling to consider surgical intervention.  s/p  RIGHT knee diagnostic genicular nerve block with 8 hours of 100% improvement\par \par Patient experienced 80% relief of knee pain for 2 days after genicular nerve block with improvement in ambulation and ability to perform adls.  Given significant relief, sp RIGHT Knee genicular nerve ABLATION\par \par Start PT for lumbar spondylosis and myofascial pain\par \par continue tizanidine prn spasm\par \par persistent pain secondary to lumbosacral stenosis demonstrated on imaging refractory to conservative treatments, s/p caudal epidural steroid injection with improvement in radicular pain\par \par SCS device interrogated and reprogrammed today - scanned into chart\par \par Significant component of axial back pain secondary to lumbar spondylosis and facet arthropathy demonstrated on MRI LS.  Pain refractory to conservative treatments.  s/p BILATERAL L3-sacral ala diagnostic medial branch block \par \par Given significant relief from diagnostic lumbar medial branch block of >80%, and continued lumbar facet arthropathy pain and axial back pain,s/p LEFT then RIGHT  L3-sacral ala medial branch radiofrequency ablation \par \par given efficacy of previous intervention that is >30% improvement in pain and improved ability to perform adls, and return of pain despite conservative treatment, will schedule repeat LEFT then RIGHT  L4-sacral ala medial branch radiofrequency ablation (2 joints,  3 nerves)  r/b/a discussed\par \par \par patient should consult with Dr. Bass regarding increasing gabapentin to 600mg TID\par \par The above diagnosis and treatment plan is medically reasonable and necessary based on the patient encounter.\par \par There were no barriers to communication.\par Informed patient that I would be available for any additional questions.\par \par Again reinforced that chronic opioid use for chronic msk pain is not the answer and can be dangerous in patient w hx of substance use disorder\par \par Patient was instructed to call with any worsening symptoms including severe pain, new numbness/weakness, or changes in the bowel/bladder function. \par Regarding opiate medication to manage pain. I had a detailed discussion with the patient regarding the risks of long-term opioid use, including the potential for medication side effects, hyperaglesia, endocrine dysfunction, addiction, tolerance, constipation, among others. Discussed relevant risks. \par \par \par

## 2021-08-21 ENCOUNTER — RESULT REVIEW (OUTPATIENT)
Age: 76
End: 2021-08-21

## 2021-09-10 ENCOUNTER — RESULT REVIEW (OUTPATIENT)
Age: 76
End: 2021-09-10

## 2021-09-13 ENCOUNTER — APPOINTMENT (OUTPATIENT)
Dept: PAIN MANAGEMENT | Facility: HOSPITAL | Age: 76
End: 2021-09-13

## 2021-09-14 ENCOUNTER — NON-APPOINTMENT (OUTPATIENT)
Age: 76
End: 2021-09-14

## 2021-09-16 ENCOUNTER — APPOINTMENT (OUTPATIENT)
Dept: PULMONOLOGY | Facility: CLINIC | Age: 76
End: 2021-09-16

## 2021-09-20 ENCOUNTER — NON-APPOINTMENT (OUTPATIENT)
Age: 76
End: 2021-09-20

## 2021-09-22 ENCOUNTER — APPOINTMENT (OUTPATIENT)
Dept: PAIN MANAGEMENT | Facility: HOSPITAL | Age: 76
End: 2021-09-22

## 2021-09-22 ENCOUNTER — RX RENEWAL (OUTPATIENT)
Age: 76
End: 2021-09-22

## 2021-09-23 ENCOUNTER — APPOINTMENT (OUTPATIENT)
Dept: PULMONOLOGY | Facility: CLINIC | Age: 76
End: 2021-09-23
Payer: MEDICARE

## 2021-09-23 VITALS
HEART RATE: 97 BPM | SYSTOLIC BLOOD PRESSURE: 105 MMHG | HEIGHT: 66 IN | TEMPERATURE: 98.1 F | BODY MASS INDEX: 35.52 KG/M2 | WEIGHT: 221 LBS | DIASTOLIC BLOOD PRESSURE: 59 MMHG | OXYGEN SATURATION: 91 %

## 2021-09-23 PROCEDURE — 99214 OFFICE O/P EST MOD 30 MIN: CPT

## 2021-09-23 NOTE — REVIEW OF SYSTEMS
[Nocturia] : nocturia [Arthralgias] : arthralgias [Chronic Pain] : chronic pain [Depression] : depression [Anxiety] : anxiety [Obesity] : obesity [Negative] : Neurologic [TextBox_30] : see HPI

## 2021-09-23 NOTE — HISTORY OF PRESENT ILLNESS
[TextBox_4] : 76 year old female with COPD, chronic hypercapnic respiratory failure, recently admitted to the hospital with COPD exacerbation and probable pneumonia came for f/u.\par Last CT chest with infiltrate in the LtLL, no nodules.\par She is on Advair and Incruse.\par Received NIV. Finds it difficult to use it more often. She was able to sleep with it 4 hrs per night.\par She has O2 Inogen system.\par Feels better. Less dyspnea. No cough.\par She completed the ABX for pneumonia and Prednisone\par Denies fever, chills, hemoptysis\par DME [employer]\par \par SHX: still smokes, cutting down. has Nicotine patches.

## 2021-09-23 NOTE — REASON FOR VISIT
[Follow-Up - From Hospitalization] : a follow-up visit after a recent hospitalization [COPD] : COPD [TextBox_44] : respiratory failure

## 2021-09-27 ENCOUNTER — APPOINTMENT (OUTPATIENT)
Dept: PAIN MANAGEMENT | Facility: HOSPITAL | Age: 76
End: 2021-09-27

## 2021-09-27 ENCOUNTER — RESULT REVIEW (OUTPATIENT)
Age: 76
End: 2021-09-27

## 2021-10-13 ENCOUNTER — APPOINTMENT (OUTPATIENT)
Dept: INTERNAL MEDICINE | Facility: CLINIC | Age: 76
End: 2021-10-13
Payer: MEDICARE

## 2021-10-13 VITALS
OXYGEN SATURATION: 92 % | DIASTOLIC BLOOD PRESSURE: 80 MMHG | WEIGHT: 221 LBS | HEIGHT: 66 IN | SYSTOLIC BLOOD PRESSURE: 120 MMHG | HEART RATE: 91 BPM | BODY MASS INDEX: 35.52 KG/M2 | TEMPERATURE: 96.6 F

## 2021-10-13 PROCEDURE — 99203 OFFICE O/P NEW LOW 30 MIN: CPT

## 2021-10-13 NOTE — ASSESSMENT
[FreeTextEntry1] : Patient with multiple medical problems as above. She is clinically stable at this time. She is advised regarding smoking cessation. I have asked patient to return to see me in 4 months. At that time labs will be drawn. Patient has received the flu vaccine previously.

## 2021-10-13 NOTE — PHYSICAL EXAM
[No Acute Distress] : no acute distress [Well Developed] : well developed [Well-Appearing] : well-appearing [Normal Sclera/Conjunctiva] : normal sclera/conjunctiva [PERRL] : pupils equal round and reactive to light [EOMI] : extraocular movements intact [Normal Outer Ear/Nose] : the outer ears and nose were normal in appearance [Normal Oropharynx] : the oropharynx was normal [No JVD] : no jugular venous distention [No Lymphadenopathy] : no lymphadenopathy [Supple] : supple [Thyroid Normal, No Nodules] : the thyroid was normal and there were no nodules present [No Respiratory Distress] : no respiratory distress  [No Accessory Muscle Use] : no accessory muscle use [Clear to Auscultation] : lungs were clear to auscultation bilaterally [Normal Rate] : normal rate  [Regular Rhythm] : with a regular rhythm [Normal S1, S2] : normal S1 and S2 [No Murmur] : no murmur heard [No Carotid Bruits] : no carotid bruits [No Abdominal Bruit] : a ~M bruit was not heard ~T in the abdomen [No Varicosities] : no varicosities [Pedal Pulses Present] : the pedal pulses are present [No Palpable Aorta] : no palpable aorta [No Extremity Clubbing/Cyanosis] : no extremity clubbing/cyanosis [Soft] : abdomen soft [Non Tender] : non-tender [Non-distended] : non-distended [No Masses] : no abdominal mass palpated [No HSM] : no HSM [Normal Bowel Sounds] : normal bowel sounds [Normal Posterior Cervical Nodes] : no posterior cervical lymphadenopathy [Normal Anterior Cervical Nodes] : no anterior cervical lymphadenopathy [No CVA Tenderness] : no CVA  tenderness [No Spinal Tenderness] : no spinal tenderness [No Joint Swelling] : no joint swelling [Grossly Normal Strength/Tone] : grossly normal strength/tone [No Rash] : no rash [Coordination Grossly Intact] : coordination grossly intact [No Focal Deficits] : no focal deficits [Normal Gait] : normal gait [Normal Affect] : the affect was normal [Normal Insight/Judgement] : insight and judgment were intact [de-identified] : obese [de-identified] : 2+ pretibial edema

## 2021-10-13 NOTE — HISTORY OF PRESENT ILLNESS
[FreeTextEntry1] : Patient seen asking for new primary medical doctor [de-identified] : Patient is a 76-year-old female with a history of COPD. She recently had an episode of respiratory failure and now is on BiPAP at bedtime. She uses oxygen p.r.n. She continues to smoke one half to one pack per day. She has a history of obesity, osteoarthritis of the hips and knees. She has a history of hypertension hyperlipidemia and benign essential tremor. Her medications were reviewed. Her weight is up to 221. Current O2 sat 91 on room air at rest. She walks with a walker. There is a history of depression and she sees psychiatry. She stopped alcohol use in 2015. She lives with her . She is a retired nurse. She received the flu vaccine previously. She states she wants to stop smoking. Last labs were done in September on her last admission.

## 2021-10-13 NOTE — REVIEW OF SYSTEMS
[Dyspnea on Exertion] : dyspnea on exertion [Joint Pain] : joint pain [Back Pain] : back pain [Negative] : Heme/Lymph

## 2021-10-21 ENCOUNTER — APPOINTMENT (OUTPATIENT)
Dept: NEUROLOGY | Facility: CLINIC | Age: 76
End: 2021-10-21

## 2021-10-25 ENCOUNTER — APPOINTMENT (OUTPATIENT)
Dept: PULMONOLOGY | Facility: CLINIC | Age: 76
End: 2021-10-25
Payer: MEDICARE

## 2021-10-25 ENCOUNTER — NON-APPOINTMENT (OUTPATIENT)
Age: 76
End: 2021-10-25

## 2021-10-25 VITALS
WEIGHT: 221 LBS | OXYGEN SATURATION: 88 % | HEART RATE: 96 BPM | BODY MASS INDEX: 35.52 KG/M2 | HEIGHT: 66 IN | TEMPERATURE: 97.8 F | DIASTOLIC BLOOD PRESSURE: 68 MMHG | SYSTOLIC BLOOD PRESSURE: 130 MMHG

## 2021-10-25 PROCEDURE — 99407 BEHAV CHNG SMOKING > 10 MIN: CPT

## 2021-10-25 PROCEDURE — 99214 OFFICE O/P EST MOD 30 MIN: CPT

## 2021-10-25 NOTE — REVIEW OF SYSTEMS
[Edema] : edema [Seasonal Allergies] : seasonal allergies [Nocturia] : nocturia [Chronic Pain] : chronic pain [Depression] : depression [Obesity] : obesity [Negative] : Neurologic [TextBox_30] : see HPI

## 2021-10-25 NOTE — PHYSICAL EXAM
[No Acute Distress] : no acute distress [Normal Appearance] : normal appearance [Normal Rate/Rhythm] : normal rate/rhythm [Normal S1, S2] : normal s1, s2 [No Resp Distress] : no resp distress [Clear to Auscultation Bilaterally] : clear to auscultation bilaterally [Kyphosis] : kyphosis [No Clubbing] : no clubbing [No Cyanosis] : no cyanosis [3+ Pitting] : 3+ pitting [No Focal Deficits] : no focal deficits [Oriented x3] : oriented x3 [Normal Affect] : normal affect

## 2021-10-25 NOTE — HISTORY OF PRESENT ILLNESS
[TextBox_4] : 76 year old female with COPD, chronic respiratory failure on home O2 and NIV came for f/u.\par Last seen in Sept 2021.\par She is on Advair and Incruse.\par \par She is still smoking. She has been on Wellbutrin for depression but did not help with smoking cessation.\par She has more allergies lately.\par She has the same dyspnea.\par Uses O2 at home mostly. She has an Inogen tank but still finds it inconvenient to walk with it.\par When she arrived on 2 LPM her O2 saturation was 88%, increased to 91% after rest.\par She is using NIV every night and longer hours than before. Tolerates it better than before and feels better after she uses it.\par No cough\par No hemoptysis\par She is not driving. Compliant with inhalers\par

## 2021-12-07 DIAGNOSIS — G43.909 MIGRAINE, UNSPECIFIED, NOT INTRACTABLE, W/OUT STATUS MIGRAINOSUS: ICD-10-CM

## 2021-12-15 ENCOUNTER — APPOINTMENT (OUTPATIENT)
Dept: PULMONOLOGY | Facility: CLINIC | Age: 76
End: 2021-12-15
Payer: MEDICARE

## 2021-12-15 VITALS
TEMPERATURE: 98 F | HEIGHT: 66 IN | DIASTOLIC BLOOD PRESSURE: 90 MMHG | WEIGHT: 221 LBS | BODY MASS INDEX: 35.52 KG/M2 | OXYGEN SATURATION: 86 % | SYSTOLIC BLOOD PRESSURE: 150 MMHG | HEART RATE: 87 BPM

## 2021-12-15 PROCEDURE — 99215 OFFICE O/P EST HI 40 MIN: CPT

## 2021-12-15 PROCEDURE — 99406 BEHAV CHNG SMOKING 3-10 MIN: CPT

## 2021-12-15 NOTE — PHYSICAL EXAM
[No Acute Distress] : no acute distress [Normal Appearance] : normal appearance [No Neck Mass] : no neck mass [Normal Rate/Rhythm] : normal rate/rhythm [Normal S1, S2] : normal s1, s2 [No Resp Distress] : no resp distress [No Acc Muscle Use] : no acc muscle use [Clear to Auscultation Bilaterally] : clear to auscultation bilaterally [No Abnormalities] : no abnormalities [No Clubbing] : no clubbing [No Cyanosis] : no cyanosis [1+ Pitting] : 1+ pitting [No Focal Deficits] : no focal deficits [Oriented x3] : oriented x3 [Normal Affect] : normal affect

## 2021-12-15 NOTE — COUNSELING
[Cessation strategies including cessation program discussed] : Cessation strategies including cessation program discussed [Use of nicotine replacement therapies and other medications discussed] : Use of nicotine replacement therapies and other medications discussed [Encouraged to pick a quit date and identify support needed to quit] : Encouraged to pick a quit date and identify support needed to quit [Smoking Cessation Program Referral] : Smoking Cessation Program Referral  [Yes] : Willing to quit smoking [FreeTextEntry1] : 5

## 2021-12-15 NOTE — REASON FOR VISIT
[Follow-Up] : a follow-up visit [COPD] : COPD [Shortness of Breath] : shortness of breath [Nicotine Dependence] : nicotine dependence

## 2021-12-15 NOTE — REVIEW OF SYSTEMS
[Edema] : edema [Nocturia] : nocturia [Depression] : depression [Obesity] : obesity [Negative] : Neurologic [TextBox_30] : see HPI

## 2021-12-15 NOTE — HISTORY OF PRESENT ILLNESS
[TextBox_4] : 76 year old female with COPD, chronic respiratory failure on NIV and O2 came for f/u.\par Last seen in Oct 2021\par She is on Advair and Incruse.\par She has dyspnea on minimal exertion.\par Has cough with clear sputum, no hemoptysis.\par She has cough for many yaers, every day. She had at least 3 COPD exacerbations in the past year.\par Has tried regular CPT and it is difficult to achieve a good response.\par CT chest with chronic mucus plugging in the LtLL\par She is compliant with Advair and Incruse.\par Uses NIV every night and she likes it, sleeps well with it\par Uses O2 in the daytime\par No exacerbations since the last visit\par \par SHX: still smokes\par \par

## 2021-12-30 ENCOUNTER — NON-APPOINTMENT (OUTPATIENT)
Age: 76
End: 2021-12-30

## 2022-01-07 ENCOUNTER — NON-APPOINTMENT (OUTPATIENT)
Age: 77
End: 2022-01-07

## 2022-01-14 ENCOUNTER — APPOINTMENT (OUTPATIENT)
Dept: INTERNAL MEDICINE | Facility: CLINIC | Age: 77
End: 2022-01-14
Payer: MEDICARE

## 2022-01-14 VITALS
SYSTOLIC BLOOD PRESSURE: 140 MMHG | BODY MASS INDEX: 35.52 KG/M2 | TEMPERATURE: 97.2 F | OXYGEN SATURATION: 87 % | WEIGHT: 221 LBS | DIASTOLIC BLOOD PRESSURE: 80 MMHG | HEIGHT: 66 IN | HEART RATE: 85 BPM

## 2022-01-14 DIAGNOSIS — S81.819A LACERATION W/OUT FOREIGN BODY, UNSPECIFIED LOWER LEG, INITIAL ENCOUNTER: ICD-10-CM

## 2022-01-14 PROCEDURE — 99213 OFFICE O/P EST LOW 20 MIN: CPT

## 2022-01-14 NOTE — HISTORY OF PRESENT ILLNESS
[FreeTextEntry1] : Patient requires suture removal. [de-identified] : Patient had a fall about 2 weeks ago and suffered a laceration to the pretibial surface on the left leg. She was hospitalized at that time due to  confusion. The etiology of the confusion is somewhat unclear. Patient was found to be hypercapnic with CO2 levels in the high 50s ranging to 70. The patient is known to have COPD with chronic hypoxemia and hypercapnia. She uses a Trilogy ventilator at nighttime. She now comes in requesting suture removal

## 2022-01-14 NOTE — PHYSICAL EXAM
[No Acute Distress] : no acute distress [Well Developed] : well developed [Well-Appearing] : well-appearing [Normal Sclera/Conjunctiva] : normal sclera/conjunctiva [PERRL] : pupils equal round and reactive to light [EOMI] : extraocular movements intact [Normal Outer Ear/Nose] : the outer ears and nose were normal in appearance [Normal Oropharynx] : the oropharynx was normal [No JVD] : no jugular venous distention [No Lymphadenopathy] : no lymphadenopathy [Supple] : supple [Thyroid Normal, No Nodules] : the thyroid was normal and there were no nodules present [No Respiratory Distress] : no respiratory distress  [No Accessory Muscle Use] : no accessory muscle use [Normal Rate] : normal rate  [Regular Rhythm] : with a regular rhythm [Normal S1, S2] : normal S1 and S2 [No Murmur] : no murmur heard [No Carotid Bruits] : no carotid bruits [No Abdominal Bruit] : a ~M bruit was not heard ~T in the abdomen [No Varicosities] : no varicosities [Pedal Pulses Present] : the pedal pulses are present [No Palpable Aorta] : no palpable aorta [No Extremity Clubbing/Cyanosis] : no extremity clubbing/cyanosis [Soft] : abdomen soft [Non Tender] : non-tender [Non-distended] : non-distended [No Masses] : no abdominal mass palpated [No HSM] : no HSM [Normal Bowel Sounds] : normal bowel sounds [Normal Posterior Cervical Nodes] : no posterior cervical lymphadenopathy [Normal Anterior Cervical Nodes] : no anterior cervical lymphadenopathy [No CVA Tenderness] : no CVA  tenderness [No Spinal Tenderness] : no spinal tenderness [No Joint Swelling] : no joint swelling [Grossly Normal Strength/Tone] : grossly normal strength/tone [No Rash] : no rash [Coordination Grossly Intact] : coordination grossly intact [No Focal Deficits] : no focal deficits [Normal Gait] : normal gait [Normal Affect] : the affect was normal [Normal Insight/Judgement] : insight and judgment were intact [de-identified] : obese [de-identified] : decreased bs's [de-identified] : 2+ pretibial edema- 4 sutures removed from l leg wound - scab noted

## 2022-01-14 NOTE — ASSESSMENT
[FreeTextEntry1] : 4 sutures removed from left leg. Patient is advised to keep the leg elevated. Patient is to follow up with  regarding ABG abnormalities

## 2022-01-21 ENCOUNTER — APPOINTMENT (OUTPATIENT)
Dept: PAIN MANAGEMENT | Facility: CLINIC | Age: 77
End: 2022-01-21
Payer: COMMERCIAL

## 2022-01-21 VITALS
WEIGHT: 221 LBS | DIASTOLIC BLOOD PRESSURE: 81 MMHG | HEIGHT: 66 IN | BODY MASS INDEX: 35.52 KG/M2 | TEMPERATURE: 98.1 F | SYSTOLIC BLOOD PRESSURE: 133 MMHG

## 2022-01-21 PROCEDURE — 20611 DRAIN/INJ JOINT/BURSA W/US: CPT

## 2022-01-21 PROCEDURE — 99214 OFFICE O/P EST MOD 30 MIN: CPT | Mod: 25

## 2022-01-21 RX ADMIN — BUPIVACAINE HYDROCHLORIDE 0 %: 2.5 INJECTION, SOLUTION EPIDURAL; INFILTRATION; INTRACAUDAL at 00:00

## 2022-01-21 RX ADMIN — Medication %: at 00:00

## 2022-01-21 RX ADMIN — TRIAMCINOLONE ACETONIDE MG/ML: 10 INJECTION, SUSPENSION INTRA-ARTICULAR; INTRALESIONAL at 00:00

## 2022-01-21 NOTE — ASSESSMENT
[FreeTextEntry1] : >> Imaging and Other Studies\par \par I personally reviewed the relevant imaging.  Discussed and explained to patient the likely source of pathology and pain.  Questions answered.\par \par >> Therapy and Other Modalities\par \par continue home exercises\par \par >> Medications\par  \par continue robaxin prn spasm\par \par >> Interventions\par \par subacromial bursitis pain refractory to conservative treatments. sp right subacromial bursa steroid injection r/b/a discussed\par \par \par back and leg pain likely secondary to lumbar radiculopathy and discogenic pain refractory to conservative treatments in patient with hx of substance use disorder\par \par Right knee pain secondary to arthritis refractory to conservative treatments and steroid injection.  Significantly debilitating to patient. s/p right knee Viscosupplementation injection \par \par Progressive right knee pain secondary to osteoarthritis refractory to conservative treatments and VS, s/p  right knee ZILRETTA  steroid injection \par \par given efficacy of previous intervention that is >30% improvement in pain and improved ability to perform adls, and return of pain despite conservative treatment, will perform repeat right knee  steroid injection r/b/a discussed\par \par informed patient of risks of steroid administration including transient worsening of blood glucose, htn, mood changes, progressive osteoporosis.  Encouraged to call with questions and concerns.\par \par \par severe knee pain secondary to osteoarthritis refractory to multiple conservative treatments as well as interventions.  Patient poor candidate for knee surgery and at this point, she is unwilling to consider surgical intervention.  s/p  RIGHT knee diagnostic genicular nerve block with 8 hours of 100% improvement\par \par Patient experienced 80% relief of knee pain for 2 days after genicular nerve block with improvement in ambulation and ability to perform adls.  Given significant relief, sp RIGHT Knee genicular nerve ABLATION\par \par Start PT for lumbar spondylosis and myofascial pain\par \par continue tizanidine prn spasm\par \par persistent pain secondary to lumbosacral stenosis demonstrated on imaging refractory to conservative treatments, s/p caudal epidural steroid injection with improvement in radicular pain\par \par SCS device interrogated and reprogrammed today - scanned into chart\par \par Significant component of axial back pain secondary to lumbar spondylosis and facet arthropathy demonstrated on MRI LS.  Pain refractory to conservative treatments.  s/p BILATERAL L3-sacral ala diagnostic medial branch block \par \par Given significant relief from diagnostic lumbar medial branch block of >80%, and continued lumbar facet arthropathy pain and axial back pain,s/p LEFT then RIGHT  L3-sacral ala medial branch radiofrequency ablation \par \par given efficacy of previous intervention that is >30% improvement in pain and improved ability to perform adls, and return of pain despite conservative treatment, will schedule repeat LEFT then RIGHT  L4-sacral ala medial branch radiofrequency ablation (2 joints,  3 nerves) r/b/a\par \par >> Consults\par \par >> Discussion of Risks/Benefits/Alternatives\par \par 	>Regarding any scheduled procedures:\par \par I have discussed in detail with the patient that any interventional pain procedure is associated with potential risks.  The procedure may include an injection of steroids and potentially other medications (local anesthetic and normal saline) into the epidural space or surrounding tissue of the spine.  There are significant risks of this procedure which include and are not limited to infection, bleeding, worsening pain, dural puncture leading to postdural puncture headache, nerve damage, spinal cord injury, paralysis, stroke, and death.  \par \par There is a chance that the procedure does not improve their pain.  \par \par There are risks associated with the steroid being absorbed into the body systemically.  These include dysphoria, difficulty sleeping, mood swings and personality changes.  Premenopausal women may notice an irregularity in her menstrual cycle for 2-3 months following the injection.  Steroids can specifically affect patients with hypertension, diabetes, and peptic ulcers.  The procedure may cause a temporary increase in blood pressure and blood pressure, and may adversely affect a peptic ulcer.  Other, more rare complications, include avascular necrosis of joints, glaucoma and worsening of osteoporosis. \par \par I have discussed the risks of the procedure at length with the patient, and the potential benefits of pain relief.  I have offered alternatives to the procedure.  All questions were answered.  \par \par The patient expressed understanding and wishes to proceed with the procedure.\par \par 	>Regarding COVID19 Pandemic: \par \par Any planned interventional pain procedure are scheduled because further delay may cause harm or negative outcome to patient.  The goal in performing this procedure is to avoid deterioration of function, emergency room visits (which increases exposure) and reliance on opioids.  \par \par r/b/a discussed with patient, lack of evidence to conclusively determine whether pain management procedures have any positive or negative impact on the possibility of alex the virus and/or development of any sequelae. \par \par Patient counselled regarding timing steroid based intervention 2 weeks before or after COVID-19 vaccine administration to avoid any interaction or affect on efficacy of vaccination\par \par Patient demonstrates understanding\par \par Informed patient that risks associated with the COVID-19 infection.  Informed patient steps taken to limit the risks.  We are implementing safety precautions and following protocols consistent with the CDC and state recommendations. All patients and staff will be checked for fever or signs of illness upon entry to the facility. We will limit our steroid dose to the lowest effective therapeutic dose or in some cases steroids will not be injected at all. \par \par Patient agrees to proceed\par \par >> Conclusion\par \par The above diagnosis and treatment plan is medically reasonable and necessary based on the patient encounter \par There were no barriers to communication.\par Informed patient that I would be available for any additional questions.\par Patient was instructed to call with any worsening symptoms including severe pain, new numbness/weakness, or changes in the bowel/bladder function. \par Discussed role of nsaids in pain management and all relevant risks, if patient is continuing to require after 4 weeks the patient should f/u for alternative treatment. \par Instructed patient to maintain pain diary to monitor pain level, mobility, and function.\par \par PROCEDURE NOTE\par \par RIGHT KNEE INJECTION ULTRASOUND \par \par The patient was given opportunity to ask questions regarding the procedure, its indications and the associated risks.  The risks of the procedure discussed include but are not limited to infection, bleeding, allergic reactions, nerve injuries, and side effects.  The patient showed understanding and wished to proceed.\par \par After obtaining informed consent, the patient's chart was reviewed, the site of operation was marked, and the patient's identification was confirmed.  The patient was brought to the Room and placed in the supine position on the room table   No skin lesions or signs of cellulitis were noted.  Strict sterile technique was used.  Skin was prepped with Chlorhexadine solution/\par \par Using sterile technique, a high-frequency, linear-array ultrasound probe was placed over the suprapatellar recess in a longitudinal orientation and the suprapatellar bursa was thus identified.  The probe was then rotated to a transverse orientation over the bursa and fluid in the band and the vastus lateralus by palpation.  Skin overlying this region was anesthetized using a [30]g needle and [1]cc 1% Lidocaine.  Following this, a [25]g  needle was inserted using an in-plane technique with the ultrasound such that the needle shaft and tip were visualized entering the suprapatellar bursa. There was no heme on aspiration.  No paresthesias were elicited throughout.  Following this a mixture of 20mg kenalog, 4cc 0.5% Bupivacaine was injected after negative intermittent heme aspirations.  There was no pain on injection.  The needle was then flushed with lidocaine and removed.  A band-aid dressing was applied.\par \par The patient tolerated the procedure well.  \par \par The patient was instructed to call with fever, chills, increased pain, redness or swelling at the injection site, weakness, numbness or weakness in the leg.\par \par \par \par

## 2022-01-21 NOTE — PHYSICAL EXAM
[Normal muscle bulk without asymmetry] : normal muscle bulk without asymmetry [Facet Tenderness] : facet tenderness [Spine: Flexion to ___ degrees, without pain] : spine: flexion to [unfilled] degrees, without pain [] : Motor: [NL] : normal and symmetric bilaterally [Normal] : Normal affect [de-identified] : .

## 2022-01-21 NOTE — HISTORY OF PRESENT ILLNESS
[0 (No Pain)] : 1. What number best describes your pain on average in the past week? 0/10 pain [10 (completely interferes)] : 3. What number best describes how, during the past week, pain has interfered with your general activity? 10/10 pain [Back Pain] : back pain [Neck Pain] : neck pain [Joint Pain] : joint  [10] : a maximum pain level of 10/10 [Sharp] : sharp [Aching] : aching [Standing] : standing [Walking] : walking [Bending] : bending [Sitting] : sitting [FreeTextEntry1] : Interval Note:\par \par Patient reports right knee pain.  Also complains of axial back pain with ambulation and transition.  Pain is so bad that patient finds it difficult to perform adls and ambulate. \par \par denies any worsening numbness, weakness, bowel/bladder dysfunction\par \par \par HPI\par \par Ms. SARAHI MARTINEZ is a 76 year F with pmhx of COPD, htn on asa 81mg  PPHx of mood disorder followed by Dr. Foster, opioid use disorder previously suboxone SL 8-2mg and SL 2-0.5mg but has since discontinued, PMHx of spinal stenosis, HTN, s/p left femoral prosthesis, [employer] Scs device placed by Dr. Muniz.  Presents today with many years of bilateral lower back pain.  Patient states that the pain is worse with transition, axial, and prevents her from being able to tolerate ALDs.  It is even worse now that she is weaned from suboxone.  denies any worsening numbness, weakness, bowel/bladder dysfunction\par \par \par Previous and current pain medications/doses/effects:\par \par Previous Pain Treatments:\par \par Previous Pain Injections:\par \par  right subacromial bursa steroid injection 7/16/21\par  RIGHT Knee genicular nerve ABLATION 5/13/21\par right knee ZILRETTA  steroid injection 11/12/20\par LEFT then RIGHT  L3-sacral ala medial branch radiofrequency ablation 8/2020\par  right knee Viscosupplementation injection completed 9/16/2020\par  right knee steroid injection 7/17/2020\par BILATERAL L3-sacral ala diagnostic medial branch block 7/16/2020\par  caudal epidural steroid injection 6/11/2020\par \par Previous Diagnostic Studies/Images:\par \par XR R knee 7/2020\par \par Severe degenerative arthritis of the right knee\par \par CT LS 5/2020\par \par There is sacralization of the L5 vertebral body.  The lumbar lordosis is preserved.  The\par vertebral body heights joint are maintained. There is mild disc space narrowing at at L1-L2.  A\par spinal stimulator is noted with the leads entering the thecal sac at the  T11-12 level.  The pre-\par vertebral soft tissues are unremarkable.\par \par  At the L1/2 level, there is a disc bulge without significant spinal canal stenosis or\par neuroforaminal narrowing..\par \par  At the L2/3 level, there is a disc bulge with facet and ligamentum flavum hypertrophy resulting in\par up to moderate spinal canal stenosis with mild to moderate bilateral neural foraminal narrowing..\par \par  At the L3/4 level, there is a disc bulge with facet and ligamentum flavum hypertrophy resulting in\par mild spinal canal stenosis with moderate bilateral neural foraminal narrowing.\par \par  At the L4/5 level, there is a disc bulge with facet and ligamentum flavum hypertrophy resulting in\par mild spinal canal stenosis with moderate to severe bilateral neural foraminal narrowing and possible\par impingement on the exiting nerve roots.\par \par  At the L5/S1 level, there is no disc herniation. There is no spinal canal stenosis or neural\par foramen narrowing.\par \par \par  IMPRESSION:\par \par  L4-5 degenerative changes resulting in mild spinal canal stenosis with moderate to severe bilateral\par neural foraminal narrowing and possible impingement on the exiting nerve root.\par \par  L3-4 degenerative changes resulting in moderate bilateral neural foraminal narrowing.\par  [FreeTextEntry2] : 20

## 2022-02-01 ENCOUNTER — APPOINTMENT (OUTPATIENT)
Dept: INTERNAL MEDICINE | Facility: CLINIC | Age: 77
End: 2022-02-01
Payer: COMMERCIAL

## 2022-02-01 VITALS
HEART RATE: 78 BPM | SYSTOLIC BLOOD PRESSURE: 130 MMHG | WEIGHT: 221 LBS | TEMPERATURE: 97.2 F | BODY MASS INDEX: 35.52 KG/M2 | HEIGHT: 66 IN | DIASTOLIC BLOOD PRESSURE: 80 MMHG | OXYGEN SATURATION: 85 %

## 2022-02-01 PROCEDURE — 99214 OFFICE O/P EST MOD 30 MIN: CPT

## 2022-02-01 NOTE — REVIEW OF SYSTEMS
[Cough] : cough [Dyspnea on Exertion] : dyspnea on exertion [Back Pain] : back pain [Negative] : Heme/Lymph

## 2022-02-01 NOTE — HISTORY OF PRESENT ILLNESS
[FreeTextEntry1] : Preop medical clearance for cataract surgery [de-identified] : This is a 76-year-old female scheduled for cataract surgery on February 8. She has a history of severe COPD currently continuing to smoke half pack per day. She uses a noninvasive ventilation at nighttime due to chronic hypercapnia. She also has hypoxemia for which she has home oxygen but uses it only on a p.r.n. basis. She also has a history of hypertension, lumbar spinal stenosis stenosis, depression and anxiety. She was discharged from the hospital about 3 weeks ago after an exacerbation of COPD. There is no documented history of cardiac disease. Past surgeries include bilateral total hip replacements and a history of an abdominal plasty in 2012. Current medications were reviewed and include trazodone, Wellbutrin, lisinopril, glyburide, Aldactone, Lasix, primidone, gabapentin, Synthroid, Robaxin, Crestor, Abilify. Allergies questionable penicillin. Social history she continues to smoke a half pack per day. Current O2 sat 89 on room at rest.

## 2022-02-01 NOTE — PHYSICAL EXAM
[No Acute Distress] : no acute distress [Well Developed] : well developed [Well-Appearing] : well-appearing [Normal Sclera/Conjunctiva] : normal sclera/conjunctiva [PERRL] : pupils equal round and reactive to light [EOMI] : extraocular movements intact [Normal Outer Ear/Nose] : the outer ears and nose were normal in appearance [Normal Oropharynx] : the oropharynx was normal [No JVD] : no jugular venous distention [No Lymphadenopathy] : no lymphadenopathy [Supple] : supple [Thyroid Normal, No Nodules] : the thyroid was normal and there were no nodules present [No Respiratory Distress] : no respiratory distress  [No Accessory Muscle Use] : no accessory muscle use [Normal Rate] : normal rate  [Regular Rhythm] : with a regular rhythm [Normal S1, S2] : normal S1 and S2 [No Murmur] : no murmur heard [No Carotid Bruits] : no carotid bruits [No Abdominal Bruit] : a ~M bruit was not heard ~T in the abdomen [No Varicosities] : no varicosities [Pedal Pulses Present] : the pedal pulses are present [No Palpable Aorta] : no palpable aorta [No Extremity Clubbing/Cyanosis] : no extremity clubbing/cyanosis [Soft] : abdomen soft [Non Tender] : non-tender [Non-distended] : non-distended [No Masses] : no abdominal mass palpated [No HSM] : no HSM [Normal Bowel Sounds] : normal bowel sounds [Normal Posterior Cervical Nodes] : no posterior cervical lymphadenopathy [Normal Anterior Cervical Nodes] : no anterior cervical lymphadenopathy [No CVA Tenderness] : no CVA  tenderness [No Spinal Tenderness] : no spinal tenderness [No Joint Swelling] : no joint swelling [Grossly Normal Strength/Tone] : grossly normal strength/tone [No Rash] : no rash [Coordination Grossly Intact] : coordination grossly intact [No Focal Deficits] : no focal deficits [Normal Gait] : normal gait [Normal Affect] : the affect was normal [Normal Insight/Judgement] : insight and judgment were intact [de-identified] : obese [de-identified] : decreased bs's [de-identified] : 1+ ankle edema

## 2022-02-01 NOTE — ASSESSMENT
[FreeTextEntry1] : Patient with severe COPD with chronic hypercapnia and hypoxemia. Patient continues to smoke half pack per day. Her respiratory status appears at baseline at this time. I feel this patient is optimally managed other than her obvious continued smoking history. I feel this patient is stable to proceed with a low risk procedure namely cataract surgery.

## 2022-02-10 ENCOUNTER — RESULT REVIEW (OUTPATIENT)
Age: 77
End: 2022-02-10

## 2022-02-14 ENCOUNTER — NON-APPOINTMENT (OUTPATIENT)
Age: 77
End: 2022-02-14

## 2022-02-16 ENCOUNTER — RX RENEWAL (OUTPATIENT)
Age: 77
End: 2022-02-16

## 2022-02-16 ENCOUNTER — APPOINTMENT (OUTPATIENT)
Dept: PULMONOLOGY | Facility: CLINIC | Age: 77
End: 2022-02-16
Payer: COMMERCIAL

## 2022-02-16 VITALS
HEART RATE: 86 BPM | DIASTOLIC BLOOD PRESSURE: 80 MMHG | BODY MASS INDEX: 35.52 KG/M2 | WEIGHT: 221 LBS | SYSTOLIC BLOOD PRESSURE: 130 MMHG | HEIGHT: 66 IN | OXYGEN SATURATION: 89 % | TEMPERATURE: 98 F

## 2022-02-16 PROCEDURE — 99214 OFFICE O/P EST MOD 30 MIN: CPT

## 2022-02-16 PROCEDURE — 99407 BEHAV CHNG SMOKING > 10 MIN: CPT

## 2022-02-16 NOTE — COUNSELING
[Cessation strategies including cessation program discussed] : Cessation strategies including cessation program discussed [Use of nicotine replacement therapies and other medications discussed] : Use of nicotine replacement therapies and other medications discussed [Encouraged to pick a quit date and identify support needed to quit] : Encouraged to pick a quit date and identify support needed to quit [Smoking Cessation Program Referral] : Smoking Cessation Program Referral  [Yes] : Willing to quit smoking [FreeTextEntry3] : 12

## 2022-02-16 NOTE — REVIEW OF SYSTEMS
[Edema] : edema [Nocturia] : nocturia [Arthralgias] : arthralgias [Ulcerations] : ulcerations [Depression] : depression [Anxiety] : anxiety [Obesity] : obesity [Negative] : Neurologic [TextBox_30] : see HPI [TextBox_104] : left leg

## 2022-02-16 NOTE — HISTORY OF PRESENT ILLNESS
[TextBox_4] : 76 year old female with respiratory failure on NIV and O2 at home, COPD, lung nodules came for f/u after a short hospitalization.\par Last CXR with improvement of b/l interstitial infiltrates.\par She was admitted with AMS and hypercapnia.\par She is not smoking since the discharge. She is on Nicotine patch.\par She is on O2 at home.\par She uses NIV at night and intermittent in the daytime.\par Did not receive Chantix or Trelegy.\par No hemoptysis.\par Has one more day of Prednisone\par \par

## 2022-02-16 NOTE — PHYSICAL EXAM
[No Acute Distress] : no acute distress [Normal Appearance] : normal appearance [No Neck Mass] : no neck mass [Normal Rate/Rhythm] : normal rate/rhythm [Normal S1, S2] : normal s1, s2 [No Resp Distress] : no resp distress [Clear to Auscultation Bilaterally] : clear to auscultation bilaterally [No Abnormalities] : no abnormalities [No Clubbing] : no clubbing [No Cyanosis] : no cyanosis [No Edema] : no edema [No Focal Deficits] : no focal deficits [Oriented x3] : oriented x3 [Normal Affect] : normal affect [TextBox_89] : obese

## 2022-02-23 ENCOUNTER — RX CHANGE (OUTPATIENT)
Age: 77
End: 2022-02-23

## 2022-03-11 ENCOUNTER — RESULT REVIEW (OUTPATIENT)
Age: 77
End: 2022-03-11

## 2022-03-11 ENCOUNTER — APPOINTMENT (OUTPATIENT)
Dept: PAIN MANAGEMENT | Facility: CLINIC | Age: 77
End: 2022-03-11
Payer: COMMERCIAL

## 2022-03-11 VITALS
RESPIRATION RATE: 14 BRPM | WEIGHT: 231 LBS | BODY MASS INDEX: 37.12 KG/M2 | OXYGEN SATURATION: 89 % | HEART RATE: 82 BPM | DIASTOLIC BLOOD PRESSURE: 72 MMHG | SYSTOLIC BLOOD PRESSURE: 125 MMHG | HEIGHT: 66 IN

## 2022-03-11 PROCEDURE — 99214 OFFICE O/P EST MOD 30 MIN: CPT | Mod: 25

## 2022-03-11 PROCEDURE — 95971 ALYS SMPL SP/PN NPGT W/PRGRM: CPT

## 2022-03-11 NOTE — HISTORY OF PRESENT ILLNESS
[Back Pain] : back pain [Neck Pain] : neck pain [Joint Pain] : joint  [10] : a maximum pain level of 10/10 [Sharp] : sharp [Aching] : aching [Standing] : standing [Walking] : walking [Bending] : bending [Sitting] : sitting [FreeTextEntry1] : Interval Note:\par \par Patient reports that she continues to have bilateral back pain, as well as right shoulder pain with limitations in ROM, and right knee pain.   Pain is so bad that patient finds it difficult to perform adls and ambulate.  Denies any worsening numbness, weakness, bowel/bladder dysfunction.\par \par \par \par   \par \par HPI\par \par Ms. SARAHI MARTINEZ is a 76 year F with pmhx of COPD, htn on asa 81mg  PPHx of mood disorder followed by Dr. Foster, opioid use disorder previously suboxone SL 8-2mg and SL 2-0.5mg but has since discontinued, PMHx of spinal stenosis, HTN, s/p left femoral prosthesis, "[employer] Scs device placed by Dr. Muniz.  Presents today with many years of bilateral lower back pain.  Patient states that the pain is worse with transition, axial, and prevents her from being able to tolerate ALDs.  It is even worse now that she is weaned from suboxone.  denies any worsening numbness, weakness, bowel/bladder dysfunction\par \par \par Previous and current pain medications/doses/effects:\par \par Previous Pain Treatments:\par \par Previous Pain Injections:\par right knee  steroid injection 1/21/22\par  right subacromial bursa steroid injection 7/16/21\par  RIGHT Knee genicular nerve ABLATION 5/13/21\par right knee ZILRETTA  steroid injection 11/12/20\par LEFT then RIGHT  L3-sacral ala medial branch radiofrequency ablation 8/2020\par  right knee Viscosupplementation injection completed 9/16/2020\par  right knee steroid injection 7/17/2020\par BILATERAL L3-sacral ala diagnostic medial branch block 7/16/2020\par  caudal epidural steroid injection 6/11/2020\par \par Previous Diagnostic Studies/Images:\par \par XR R knee 7/2020\par \par Severe degenerative arthritis of the right knee\par \par CT LS 5/2020\par \par There is sacralization of the L5 vertebral body.  The lumbar lordosis is preserved.  The\par vertebral body heights joint are maintained. There is mild disc space narrowing at at L1-L2.  A\par spinal stimulator is noted with the leads entering the thecal sac at the  T11-12 level.  The pre-\par vertebral soft tissues are unremarkable.\par \par  At the L1/2 level, there is a disc bulge without significant spinal canal stenosis or\par neuroforaminal narrowing..\par \par  At the L2/3 level, there is a disc bulge with facet and ligamentum flavum hypertrophy resulting in\par up to moderate spinal canal stenosis with mild to moderate bilateral neural foraminal narrowing..\par \par  At the L3/4 level, there is a disc bulge with facet and ligamentum flavum hypertrophy resulting in\par mild spinal canal stenosis with moderate bilateral neural foraminal narrowing.\par \par  At the L4/5 level, there is a disc bulge with facet and ligamentum flavum hypertrophy resulting in\par mild spinal canal stenosis with moderate to severe bilateral neural foraminal narrowing and possible\par impingement on the exiting nerve roots.\par \par  At the L5/S1 level, there is no disc herniation. There is no spinal canal stenosis or neural\par foramen narrowing.\par \par \par  IMPRESSION:\par \par  L4-5 degenerative changes resulting in mild spinal canal stenosis with moderate to severe bilateral\par neural foraminal narrowing and possible impingement on the exiting nerve root.\par \par  L3-4 degenerative changes resulting in moderate bilateral neural foraminal narrowing.\par

## 2022-03-11 NOTE — ASSESSMENT
[FreeTextEntry1] : >> Imaging and Other Studies\par \par I personally reviewed the relevant imaging.  Discussed and explained to patient the likely source of pathology and pain.  Questions answered.\par \par XR R shoulder to evaluate pathology\par \par >> Therapy and Other Modalities\par \par continue home exercises\par \par >> Medications\par  \par continue robaxin prn spasm\par \par >> Interventions\par \par \par scs device programmed and scanned into chart\par \par subacromial bursitis pain refractory to conservative treatments. sp right subacromial bursa steroid injection r/b/a discussed\par \par  shoulder pain likely secondary to significant joint arthropathy demonstrated on imaging refractory to conservative treatments. Will schedule RIGHT glenohumeral joint steroid injection r/b/a discussed\par \par \par back and leg pain likely secondary to lumbar radiculopathy and discogenic pain refractory to conservative treatments in patient with hx of substance use disorder\par \par Right knee pain secondary to arthritis refractory to conservative treatments and steroid injection.  Significantly debilitating to patient. s/p right knee Viscosupplementation injection\par \par given efficacy of previous intervention that is >30% improvement in pain and improved ability to perform adls, and return of pain despite conservative treatment, will schedule repeat  right knee Viscosupplementation injection\par  \par \par Progressive right knee pain secondary to osteoarthritis refractory to conservative treatments and VS, s/p  right knee ZILRETTA  steroid injection \par \par given efficacy of previous intervention that is >30% improvement in pain and improved ability to perform adls, and return of pain despite conservative treatment, sp repeat right knee  steroid injection \par \par severe knee pain secondary to osteoarthritis refractory to multiple conservative treatments as well as interventions.  Patient poor candidate for knee surgery and at this point, she is unwilling to consider surgical intervention.  s/p  RIGHT knee diagnostic genicular nerve block with 8 hours of 100% improvement\par \par Patient experienced 80% relief of knee pain for 2 days after genicular nerve block with improvement in ambulation and ability to perform adls.  Given significant relief, sp RIGHT Knee genicular nerve ABLATION\par \par Start PT for lumbar spondylosis and myofascial pain\par \par continue tizanidine prn spasm\par \par persistent pain secondary to lumbosacral stenosis demonstrated on imaging refractory to conservative treatments, s/p caudal epidural steroid injection with improvement in radicular pain\par \par SCS device interrogated and reprogrammed today - scanned into chart\par \par Significant component of axial back pain secondary to lumbar spondylosis and facet arthropathy demonstrated on MRI LS.  Pain refractory to conservative treatments.  s/p BILATERAL L3-sacral ala diagnostic medial branch block \par \par Given significant relief from diagnostic lumbar medial branch block of >80%, and continued lumbar facet arthropathy pain and axial back pain,s/p LEFT then RIGHT  L3-sacral ala medial branch radiofrequency ablation \par \par given efficacy of previous intervention that is >30% improvement in pain and improved ability to perform adls, and return of pain despite conservative treatment, may consider repeat LEFT then RIGHT  L4-sacral ala medial branch radiofrequency ablation (2 joints,  3 nerves)\par \par >> Consults\par \par >> Discussion of Risks/Benefits/Alternatives\par \par 	>Regarding any scheduled procedures:\par \par I have discussed in detail with the patient that any interventional pain procedure is associated with potential risks.  The procedure may include an injection of steroids and potentially other medications (local anesthetic and normal saline) into the epidural space or surrounding tissue of the spine.  There are significant risks of this procedure which include and are not limited to infection, bleeding, worsening pain, dural puncture leading to postdural puncture headache, nerve damage, spinal cord injury, paralysis, stroke, and death.  \par \par There is a chance that the procedure does not improve their pain.  \par \par There are risks associated with the steroid being absorbed into the body systemically.  These include dysphoria, difficulty sleeping, mood swings and personality changes.  Premenopausal women may notice an irregularity in her menstrual cycle for 2-3 months following the injection.  Steroids can specifically affect patients with hypertension, diabetes, and peptic ulcers.  The procedure may cause a temporary increase in blood pressure and blood pressure, and may adversely affect a peptic ulcer.  Other, more rare complications, include avascular necrosis of joints, glaucoma and worsening of osteoporosis. \par \par I have discussed the risks of the procedure at length with the patient, and the potential benefits of pain relief.  I have offered alternatives to the procedure.  All questions were answered.  \par \par The patient expressed understanding and wishes to proceed with the procedure.\par \par 	>Regarding COVID19 Pandemic: \par \par Any planned interventional pain procedure are scheduled because further delay may cause harm or negative outcome to patient.  The goal in performing this procedure is to avoid deterioration of function, emergency room visits (which increases exposure) and reliance on opioids.  \par \par r/b/a discussed with patient, lack of evidence to conclusively determine whether pain management procedures have any positive or negative impact on the possibility of alex the virus and/or development of any sequelae. \par \par Patient counselled regarding timing steroid based intervention 2 weeks before or after COVID-19 vaccine administration to avoid any interaction or affect on efficacy of vaccination\par \par Patient demonstrates understanding\par \par Informed patient that risks associated with the COVID-19 infection.  Informed patient steps taken to limit the risks.  We are implementing safety precautions and following protocols consistent with the CDC and state recommendations. All patients and staff will be checked for fever or signs of illness upon entry to the facility. We will limit our steroid dose to the lowest effective therapeutic dose or in some cases steroids will not be injected at all. \par \par Patient agrees to proceed\par \par >> Conclusion\par \par The above diagnosis and treatment plan is medically reasonable and necessary based on the patient encounter \par There were no barriers to communication.\par Informed patient that I would be available for any additional questions.\par Patient was instructed to call with any worsening symptoms including severe pain, new numbness/weakness, or changes in the bowel/bladder function. \par Discussed role of nsaids in pain management and all relevant risks, if patient is continuing to require after 4 weeks the patient should f/u for alternative treatment. \par Instructed patient to maintain pain diary to monitor pain level, mobility, and function.\par \par \par \par

## 2022-03-18 ENCOUNTER — APPOINTMENT (OUTPATIENT)
Dept: PAIN MANAGEMENT | Facility: CLINIC | Age: 77
End: 2022-03-18
Payer: COMMERCIAL

## 2022-03-18 VITALS
SYSTOLIC BLOOD PRESSURE: 139 MMHG | WEIGHT: 231 LBS | HEIGHT: 66 IN | BODY MASS INDEX: 37.12 KG/M2 | DIASTOLIC BLOOD PRESSURE: 83 MMHG | TEMPERATURE: 98 F

## 2022-03-18 PROCEDURE — 20611 DRAIN/INJ JOINT/BURSA W/US: CPT

## 2022-03-18 PROCEDURE — 99214 OFFICE O/P EST MOD 30 MIN: CPT | Mod: 25

## 2022-03-18 RX ADMIN — BUPIVACAINE HYDROCHLORIDE %: 2.5 INJECTION, SOLUTION INFILTRATION; PERINEURAL at 00:00

## 2022-03-18 RX ADMIN — TRIAMCINOLONE ACETONIDE MG/ML: 10 INJECTION, SUSPENSION INTRA-ARTICULAR; INTRALESIONAL at 00:00

## 2022-03-18 NOTE — PHYSICAL EXAM
[Normal] : Well developed, in no acute distress, alert and oriented to person, place and time [Normal muscle bulk without asymmetry] : normal muscle bulk without asymmetry [Antalgic] : antalgic [] : Motor: [NL] : normal and symmetric bilaterally [Facet Tenderness] : no facet tenderness

## 2022-03-18 NOTE — HISTORY OF PRESENT ILLNESS
[8] : 3. What number best describes how, during the past week, pain has interfered with your general activity? 8/10 pain [Back Pain] : back pain [Neck Pain] : neck pain [Joint Pain] : joint  [10] : a maximum pain level of 10/10 [Sharp] : sharp [Aching] : aching [Standing] : standing [Walking] : walking [Bending] : bending [Sitting] : sitting [FreeTextEntry1] : Interval Note:\par \par Patient reports that she continues to have bilateral back pain, as well as right shoulder pain with limitations in ROM, and right knee pain.   Patient states that times she would like a prn anti-inflammatory, which has helped her friend.  She is curious about trying mobic. Pain is so bad that patient finds it difficult to perform adls and ambulate.  Denies any worsening numbness, weakness, bowel/bladder dysfunction.\par \par   \par \par HPI\par \par Ms. SARAHI MARTINEZ is a 76 year F with pmhx of COPD, htn on asa 81mg  PPHx of mood disorder followed by Dr. Foster, opioid use disorder previously suboxone SL 8-2mg and SL 2-0.5mg but has since discontinued, PMHx of spinal stenosis, HTN, s/p left femoral prosthesis, [employer] Scs device placed by Dr. Muniz.  Presents today with many years of bilateral lower back pain.  Patient states that the pain is worse with transition, axial, and prevents her from being able to tolerate ALDs.  It is even worse now that she is weaned from suboxone.  denies any worsening numbness, weakness, bowel/bladder dysfunction\par \par \par Previous and current pain medications/doses/effects:\par \par Previous Pain Treatments:\par \par Previous Pain Injections:\par \par right knee  steroid injection 1/21/22\par  right subacromial bursa steroid injection 7/16/21\par  RIGHT Knee genicular nerve ABLATION 5/13/21\par right knee ZILRETTA  steroid injection 11/12/20\par LEFT then RIGHT  L3-sacral ala medial branch radiofrequency ablation 8/2020\par  right knee Viscosupplementation injection completed 9/16/2020\par  right knee steroid injection 7/17/2020\par BILATERAL L3-sacral ala diagnostic medial branch block 7/16/2020\par  caudal epidural steroid injection 6/11/2020\par \par Previous Diagnostic Studies/Images:\par  \par XR R Shoulder 3/22\par \par Moderate degenerative spurring of the humeral head. If clinically indicated, further\par assessment of the shoulder with MRI is recommended.\par \par XR R knee 7/2020\par \par Severe degenerative arthritis of the right knee\par \par CT LS 5/2020\par \par There is sacralization of the L5 vertebral body.  The lumbar lordosis is preserved.  The\par vertebral body heights joint are maintained. There is mild disc space narrowing at at L1-L2.  A\par spinal stimulator is noted with the leads entering the thecal sac at the  T11-12 level.  The pre-\par vertebral soft tissues are unremarkable.\par \par  At the L1/2 level, there is a disc bulge without significant spinal canal stenosis or\par neuroforaminal narrowing..\par \par  At the L2/3 level, there is a disc bulge with facet and ligamentum flavum hypertrophy resulting in\par up to moderate spinal canal stenosis with mild to moderate bilateral neural foraminal narrowing..\par \par  At the L3/4 level, there is a disc bulge with facet and ligamentum flavum hypertrophy resulting in\par mild spinal canal stenosis with moderate bilateral neural foraminal narrowing.\par \par  At the L4/5 level, there is a disc bulge with facet and ligamentum flavum hypertrophy resulting in\par mild spinal canal stenosis with moderate to severe bilateral neural foraminal narrowing and possible\par impingement on the exiting nerve roots.\par \par  At the L5/S1 level, there is no disc herniation. There is no spinal canal stenosis or neural\par foramen narrowing.\par \par \par  IMPRESSION:\par \par  L4-5 degenerative changes resulting in mild spinal canal stenosis with moderate to severe bilateral\par neural foraminal narrowing and possible impingement on the exiting nerve root.\par \par  L3-4 degenerative changes resulting in moderate bilateral neural foraminal narrowing.\par  [FreeTextEntry2] : 24

## 2022-03-18 NOTE — ASSESSMENT
[FreeTextEntry1] : >> Imaging and Other Studies\par \par I personally reviewed the relevant imaging.  Discussed and explained to patient the likely source of pathology and pain.  Questions answered.\par \par I personally reviewed the relevant imaging.  Discussed and explained to patient the likely source of pathology and pain.  Questions answered. XR\par \par >> Therapy and Other Modalities\par \par continue home exercises\par \par >> Medications\par  \par continue robaxin prn spasm\par \par I advised SARAHI that the NSAID should be taken with food.  In addition while taking the prescribed NSAID, no over the counter or other NSAIDs should be used, such as ibuprofen (Motrin or Advil) or naproxen (Aleve) as this can cause stomach upset or other side effects.  If needed for fever or breakthrough pain Tylenol can be used.\par \par will trial meloxicam 15mg prn pain\par \par >> Interventions\par \par scs device programmed and scanned into chart\par \par subacromial bursitis pain refractory to conservative treatments. sp right subacromial bursa steroid injection r/b/a discussed\par \par  shoulder pain likely secondary to significant joint arthropathy demonstrated on imaging refractory to conservative treatments. Will perform RIGHT glenohumeral joint steroid injection r/b/a discussed, consented\par \par \par back and leg pain likely secondary to lumbar radiculopathy and discogenic pain refractory to conservative treatments in patient with hx of substance use disorder\par \par Right knee pain secondary to arthritis refractory to conservative treatments and steroid injection.  Significantly debilitating to patient. s/p right knee Viscosupplementation injection\par \par given efficacy of previous intervention that is >30% improvement in pain and improved ability to perform adls, and return of pain despite conservative treatment, will schedule repeat  right knee Viscosupplementation injection\par  \par \par Progressive right knee pain secondary to osteoarthritis refractory to conservative treatments and VS, s/p  right knee ZILRETTA  steroid injection \par \par given efficacy of previous intervention that is >30% improvement in pain and improved ability to perform adls, and return of pain despite conservative treatment, sp repeat right knee  steroid injection \par \par severe knee pain secondary to osteoarthritis refractory to multiple conservative treatments as well as interventions.  Patient poor candidate for knee surgery and at this point, she is unwilling to consider surgical intervention.  s/p  RIGHT knee diagnostic genicular nerve block with 8 hours of 100% improvement\par \par Patient experienced 80% relief of knee pain for 2 days after genicular nerve block with improvement in ambulation and ability to perform adls.  Given significant relief, sp RIGHT Knee genicular nerve ABLATION\par \par Start PT for lumbar spondylosis and myofascial pain\par \par continue tizanidine prn spasm\par \par persistent pain secondary to lumbosacral stenosis demonstrated on imaging refractory to conservative treatments, s/p caudal epidural steroid injection with improvement in radicular pain\par \par SCS device interrogated and reprogrammed today - scanned into chart\par \par Significant component of axial back pain secondary to lumbar spondylosis and facet arthropathy demonstrated on MRI LS.  Pain refractory to conservative treatments.  s/p BILATERAL L3-sacral ala diagnostic medial branch block \par \par Given significant relief from diagnostic lumbar medial branch block of >80%, and continued lumbar facet arthropathy pain and axial back pain,s/p LEFT then RIGHT  L3-sacral ala medial branch radiofrequency ablation \par \par given efficacy of previous intervention that is >30% improvement in pain and improved ability to perform adls, and return of pain despite conservative treatment, may consider repeat LEFT then RIGHT  L4-sacral ala medial branch radiofrequency ablation (2 joints,  3 nerves)\par \par >> Consults\par \par >> Discussion of Risks/Benefits/Alternatives\par \par 	>Regarding any scheduled procedures:\par \par I have discussed in detail with the patient that any interventional pain procedure is associated with potential risks.  The procedure may include an injection of steroids and potentially other medications (local anesthetic and normal saline) into the epidural space or surrounding tissue of the spine.  There are significant risks of this procedure which include and are not limited to infection, bleeding, worsening pain, dural puncture leading to postdural puncture headache, nerve damage, spinal cord injury, paralysis, stroke, and death.  \par \par There is a chance that the procedure does not improve their pain.  \par \par There are risks associated with the steroid being absorbed into the body systemically.  These include dysphoria, difficulty sleeping, mood swings and personality changes.  Premenopausal women may notice an irregularity in her menstrual cycle for 2-3 months following the injection.  Steroids can specifically affect patients with hypertension, diabetes, and peptic ulcers.  The procedure may cause a temporary increase in blood pressure and blood pressure, and may adversely affect a peptic ulcer.  Other, more rare complications, include avascular necrosis of joints, glaucoma and worsening of osteoporosis. \par \par I have discussed the risks of the procedure at length with the patient, and the potential benefits of pain relief.  I have offered alternatives to the procedure.  All questions were answered.  \par \par The patient expressed understanding and wishes to proceed with the procedure.\par \par 	>Regarding COVID19 Pandemic: \par \par Any planned interventional pain procedure are scheduled because further delay may cause harm or negative outcome to patient.  The goal in performing this procedure is to avoid deterioration of function, emergency room visits (which increases exposure) and reliance on opioids.  \par \par r/b/a discussed with patient, lack of evidence to conclusively determine whether pain management procedures have any positive or negative impact on the possibility of alex the virus and/or development of any sequelae. \par \par Patient counselled regarding timing steroid based intervention 2 weeks before or after COVID-19 vaccine administration to avoid any interaction or affect on efficacy of vaccination\par \par Patient demonstrates understanding\par \par Informed patient that risks associated with the COVID-19 infection.  Informed patient steps taken to limit the risks.  We are implementing safety precautions and following protocols consistent with the CDC and state recommendations. All patients and staff will be checked for fever or signs of illness upon entry to the facility. We will limit our steroid dose to the lowest effective therapeutic dose or in some cases steroids will not be injected at all. \par \par Patient agrees to proceed\par \par >> Conclusion\par \par The above diagnosis and treatment plan is medically reasonable and necessary based on the patient encounter \par There were no barriers to communication.\par Informed patient that I would be available for any additional questions.\par Patient was instructed to call with any worsening symptoms including severe pain, new numbness/weakness, or changes in the bowel/bladder function. \par Discussed role of nsaids in pain management and all relevant risks, if patient is continuing to require after 4 weeks the patient should f/u for alternative treatment. \par Instructed patient to maintain pain diary to monitor pain level, mobility, and function.\par \par PROCEDURE NOTE\par \par SHOULDER INJECTION ULTRASOUND\par \par The patient was given opportunity to ask questions regarding the procedure, its indications and the associated risks.  The risks of the procedure discussed include but are not limited to infection, bleeding, allergic reactions, nerve injuries, and side effects.  The patient showed understanding and wished to proceed.\par \par After obtaining informed consent, the patient's chart was reviewed, the site of operation was marked, and the patient's identification was confirmed.  The patient was brought to the Exam Room and placed in a seated position on the exam room table with the] [RIGHT hand resting on the contralateral shoulder. Strict sterile technique was used.  Skin was prepped with Chlorhexadine solution and draped in sterile manner.\par \par Using sterile technique, a high-frequency, linear-array ultrasound probe was placed in a position just caudad to the acromion and parallel with the spine of the scapula. The area between the glenoid and humeral head was identified.  An entry point just lateral to the ultrasound probe was anesthetized using a [30]g needle and [1]cc 1% lidocaine. Following this, a [21]g needle was inserted using an in-plane technique with the ultrasound such that the needle shaft and tip were visualized. When the needle tip reached the junction between the glenoid and humeral head, and after negative aspiration for heme, a mixture of 20mg kenalog with 4cc 0.5% bupivacaine was injected. The needle was then flushed with lidocaine and removed.  A band-aid dressing was applied.\par \par The patient tolerated the procedure well and there were no immediate adverse event. The patient was asked to follow up in 2 weeks and was instructed to call with fever, chills, increased pain, redness or swelling at the injection site, numbness or weakness.\par \par \par

## 2022-04-01 ENCOUNTER — APPOINTMENT (OUTPATIENT)
Dept: PAIN MANAGEMENT | Facility: CLINIC | Age: 77
End: 2022-04-01
Payer: COMMERCIAL

## 2022-04-01 ENCOUNTER — APPOINTMENT (OUTPATIENT)
Dept: PAIN MANAGEMENT | Facility: CLINIC | Age: 77
End: 2022-04-01

## 2022-04-01 VITALS
HEIGHT: 66 IN | TEMPERATURE: 98 F | WEIGHT: 231 LBS | BODY MASS INDEX: 37.12 KG/M2 | DIASTOLIC BLOOD PRESSURE: 81 MMHG | SYSTOLIC BLOOD PRESSURE: 125 MMHG

## 2022-04-01 PROCEDURE — 20611 DRAIN/INJ JOINT/BURSA W/US: CPT

## 2022-04-01 PROCEDURE — 99214 OFFICE O/P EST MOD 30 MIN: CPT | Mod: 25

## 2022-04-01 RX ADMIN — TRIAMCINOLONE ACETONIDE MG/ML: 10 INJECTION, SUSPENSION INTRA-ARTICULAR; INTRALESIONAL at 00:00

## 2022-04-01 RX ADMIN — BUPIVACAINE HYDROCHLORIDE %: 2.5 INJECTION, SOLUTION INFILTRATION; PERINEURAL at 00:00

## 2022-04-01 NOTE — PHYSICAL EXAM
[Normal] : Well developed, in no acute distress, alert and oriented to person, place and time [Normal muscle bulk without asymmetry] : normal muscle bulk without asymmetry

## 2022-04-01 NOTE — ASSESSMENT
[FreeTextEntry1] : >> Imaging and Other Studies\par \par I personally reviewed the relevant imaging.  Discussed and explained to patient the likely source of pathology and pain.  Questions answered.\par \par I personally reviewed the relevant imaging.  Discussed and explained to patient the likely source of pathology and pain.  Questions answered. XR\par \par >> Therapy and Other Modalities\par \par continue home exercises\par \par >> Medications\par  \par continue robaxin prn spasm\par \par I advised SARAHI that the NSAID should be taken with food.  In addition while taking the prescribed NSAID, no over the counter or other NSAIDs should be used, such as ibuprofen (Motrin or Advil) or naproxen (Aleve) as this can cause stomach upset or other side effects.  If needed for fever or breakthrough pain Tylenol can be used.\par \par will trial meloxicam 15mg prn pain\par \par >> Interventions\par \par scs device programmed and scanned into chart\par \par subacromial bursitis pain refractory to conservative treatments. sp right subacromial bursa steroid injection r/b/a discussed\par \par  shoulder pain likely secondary to significant joint arthropathy demonstrated on imaging refractory to conservative treatments. sp  RIGHT glenohumeral joint steroid injection r/b/a discussed, consented\par \par Progressive right knee pain secondary to osteoarthritis refractory to conservative treatments, will perform right knee steroid injection r/b/a discussed\par \par \par back and leg pain likely secondary to lumbar radiculopathy and discogenic pain refractory to conservative treatments in patient with hx of substance use disorder\par \par Right knee pain secondary to arthritis refractory to conservative treatments and steroid injection.  Significantly debilitating to patient. s/p right knee Viscosupplementation injection\par \par given efficacy of previous intervention that is >30% improvement in pain and improved ability to perform adls, and return of pain despite conservative treatment, will schedule repeat  right knee Viscosupplementation injection\par  \par \par \par severe knee pain secondary to osteoarthritis refractory to multiple conservative treatments as well as interventions.  Patient poor candidate for knee surgery and at this point, she is unwilling to consider surgical intervention.  s/p  RIGHT knee diagnostic genicular nerve block with 8 hours of 100% improvement\par \par Patient experienced 80% relief of knee pain for 2 days after genicular nerve block with improvement in ambulation and ability to perform adls.  Given significant relief, sp RIGHT Knee genicular nerve ABLATION\par \par Start PT for lumbar spondylosis and myofascial pain\par \par continue tizanidine prn spasm\par \par persistent pain secondary to lumbosacral stenosis demonstrated on imaging refractory to conservative treatments, s/p caudal epidural steroid injection with improvement in radicular pain\par \par SCS device interrogated and reprogrammed today - scanned into chart\par \par Significant component of axial back pain secondary to lumbar spondylosis and facet arthropathy demonstrated on MRI LS.  Pain refractory to conservative treatments.  s/p BILATERAL L3-sacral ala diagnostic medial branch block \par \par Given significant relief from diagnostic lumbar medial branch block of >80%, and continued lumbar facet arthropathy pain and axial back pain,s/p LEFT then RIGHT  L3-sacral ala medial branch radiofrequency ablation \par \par given efficacy of previous intervention that is >30% improvement in pain and improved ability to perform adls, and return of pain despite conservative treatment, may consider repeat LEFT then RIGHT  L4-sacral ala medial branch radiofrequency ablation (2 joints,  3 nerves)\par \par >> Consults\par \par >> Discussion of Risks/Benefits/Alternatives\par \par 	>Regarding any scheduled procedures:\par \par I have discussed in detail with the patient that any interventional pain procedure is associated with potential risks.  The procedure may include an injection of steroids and potentially other medications (local anesthetic and normal saline) into the epidural space or surrounding tissue of the spine.  There are significant risks of this procedure which include and are not limited to infection, bleeding, worsening pain, dural puncture leading to postdural puncture headache, nerve damage, spinal cord injury, paralysis, stroke, and death.  \par \par There is a chance that the procedure does not improve their pain.  \par \par There are risks associated with the steroid being absorbed into the body systemically.  These include dysphoria, difficulty sleeping, mood swings and personality changes.  Premenopausal women may notice an irregularity in her menstrual cycle for 2-3 months following the injection.  Steroids can specifically affect patients with hypertension, diabetes, and peptic ulcers.  The procedure may cause a temporary increase in blood pressure and blood pressure, and may adversely affect a peptic ulcer.  Other, more rare complications, include avascular necrosis of joints, glaucoma and worsening of osteoporosis. \par \par I have discussed the risks of the procedure at length with the patient, and the potential benefits of pain relief.  I have offered alternatives to the procedure.  All questions were answered.  \par \par The patient expressed understanding and wishes to proceed with the procedure.\par \par 	>Regarding COVID19 Pandemic: \par \par Any planned interventional pain procedure are scheduled because further delay may cause harm or negative outcome to patient.  The goal in performing this procedure is to avoid deterioration of function, emergency room visits (which increases exposure) and reliance on opioids.  \par \par r/b/a discussed with patient, lack of evidence to conclusively determine whether pain management procedures have any positive or negative impact on the possibility of alex the virus and/or development of any sequelae. \par \par Patient counselled regarding timing steroid based intervention 2 weeks before or after COVID-19 vaccine administration to avoid any interaction or affect on efficacy of vaccination\par \par Patient demonstrates understanding\par \par Informed patient that risks associated with the COVID-19 infection.  Informed patient steps taken to limit the risks.  We are implementing safety precautions and following protocols consistent with the CDC and state recommendations. All patients and staff will be checked for fever or signs of illness upon entry to the facility. We will limit our steroid dose to the lowest effective therapeutic dose or in some cases steroids will not be injected at all. \par \par Patient agrees to proceed\par \par >> Conclusion\par \par The above diagnosis and treatment plan is medically reasonable and necessary based on the patient encounter \par There were no barriers to communication.\par Informed patient that I would be available for any additional questions.\par Patient was instructed to call with any worsening symptoms including severe pain, new numbness/weakness, or changes in the bowel/bladder function. \par Discussed role of nsaids in pain management and all relevant risks, if patient is continuing to require after 4 weeks the patient should f/u for alternative treatment. \par Instructed patient to maintain pain diary to monitor pain level, mobility, and function.\par \par PROCEDURE NOTE\par \par KNEE INJECTION ULTRASOUND RIGHT\par \par The patient was given opportunity to ask questions regarding the procedure, its indications and the associated risks.  The risks of the procedure discussed include but are not limited to infection, bleeding, allergic reactions, nerve injuries, and side effects.  The patient showed understanding and wished to proceed.\par \par After obtaining informed consent, the patient's chart was reviewed, the site of operation was marked, and the patient's identification was confirmed.  The patient was brought to the Room and placed in the supine position on the room table   No skin lesions or signs of cellulitis were noted.  Strict sterile technique was used.  Skin was prepped with Chlorhexadine solution/\par \par Using sterile technique, a high-frequency, linear-array ultrasound probe was placed over the suprapatellar recess in a longitudinal orientation and the suprapatellar bursa was thus identified.  The probe was then rotated to a transverse orientation over the bursa and fluid in the band and the vastus lateralus by palpation.  Skin overlying this region was anesthetized using a [30]g needle and [1]cc 1% Lidocaine.  Following this, a [25]g  needle was inserted using an in-plane technique with the ultrasound such that the needle shaft and tip were visualized entering the suprapatellar bursa. There was no heme on aspiration.  No paresthesias were elicited throughout.  Following this a mixture of 20mg kenalog, 4cc 0.5% Bupivacaine was injected after negative intermittent heme aspirations.  There was no pain on injection.  The needle was then flushed with lidocaine and removed.  A band-aid dressing was applied.\par \par The patient tolerated the procedure well.  \par \par The patient was instructed to call with fever, chills, increased pain, redness or swelling at the injection site, weakness, numbness or weakness in the leg.\par \par

## 2022-04-01 NOTE — HISTORY OF PRESENT ILLNESS
[10 (pain as bad as you can imagine)] : 1. What number best describes your pain on average in the past week? 10/10 pain [10 (completely interferes)] : 3. What number best describes how, during the past week, pain has interfered with your general activity? 10/10 pain [Back Pain] : back pain [Neck Pain] : neck pain [Joint Pain] : joint  [10] : a maximum pain level of 10/10 [Sharp] : sharp [Aching] : aching [Standing] : standing [Walking] : walking [Bending] : bending [Sitting] : sitting [FreeTextEntry2] : 30 [FreeTextEntry1] : Interval Note:\par sp RIGHT glenohumeral joint steroid injection with improvement in shoulder pain\par Patient complains of right knee pain, worse with ambulation.  Denies any worsening numbness, weakness, bowel/bladder dysfunction.\par \par   \par \par HPI\par \par Ms. SARAHI MARTINEZ is a 76 year F with pmhx of COPD, htn on asa 81mg  PPHx of mood disorder followed by Dr. Foster, opioid use disorder previously suboxone SL 8-2mg and SL 2-0.5mg but has since discontinued, PMHx of spinal stenosis, HTN, s/p left femoral prosthesis, [employer] [facility] device placed by Dr. Muniz.  Presents today with many years of bilateral lower back pain.  Patient states that the pain is worse with transition, axial, and prevents her from being able to tolerate ALDs.  It is even worse now that she is weaned from suboxone.  denies any worsening numbness, weakness, bowel/bladder dysfunction\par \par \par Previous and current pain medications/doses/effects:\par \par Previous Pain Treatments:\par \par Previous Pain Injections:\par RIGHT knee joint steroid injection 4/1/22\par RIGHT glenohumeral joint steroid injection 3/18/22\par right knee  steroid injection 1/21/22\par  right subacromial bursa steroid injection 7/16/21\par  RIGHT Knee genicular nerve ABLATION 5/13/21\par right knee ZILRETTA  steroid injection 11/12/20\par LEFT then RIGHT  L3-sacral ala medial branch radiofrequency ablation 8/2020\par  right knee Viscosupplementation injection completed 9/16/2020\par  right knee steroid injection 7/17/2020\par BILATERAL L3-sacral ala diagnostic medial branch block 7/16/2020\par  caudal epidural steroid injection 6/11/2020\par \par Previous Diagnostic Studies/Images:\par  \par XR R Shoulder 3/22\par \par Moderate degenerative spurring of the humeral head. If clinically indicated, further\par assessment of the shoulder with MRI is recommended.\par \par XR R knee 7/2020\par \par Severe degenerative arthritis of the right knee\par \par CT LS 5/2020\par \par There is sacralization of the L5 vertebral body.  The lumbar lordosis is preserved.  The\par vertebral body heights joint are maintained. There is mild disc space narrowing at at L1-L2.  A\par spinal stimulator is noted with the leads entering the thecal sac at the  T11-12 level.  The pre-\par vertebral soft tissues are unremarkable.\par \par  At the L1/2 level, there is a disc bulge without significant spinal canal stenosis or\par neuroforaminal narrowing..\par \par  At the L2/3 level, there is a disc bulge with facet and ligamentum flavum hypertrophy resulting in\par up to moderate spinal canal stenosis with mild to moderate bilateral neural foraminal narrowing..\par \par  At the L3/4 level, there is a disc bulge with facet and ligamentum flavum hypertrophy resulting in\par mild spinal canal stenosis with moderate bilateral neural foraminal narrowing.\par \par  At the L4/5 level, there is a disc bulge with facet and ligamentum flavum hypertrophy resulting in\par mild spinal canal stenosis with moderate to severe bilateral neural foraminal narrowing and possible\par impingement on the exiting nerve roots.\par \par  At the L5/S1 level, there is no disc herniation. There is no spinal canal stenosis or neural\par foramen narrowing.\par \par \par  IMPRESSION:\par \par  L4-5 degenerative changes resulting in mild spinal canal stenosis with moderate to severe bilateral\par neural foraminal narrowing and possible impingement on the exiting nerve root.\par \par  L3-4 degenerative changes resulting in moderate bilateral neural foraminal narrowing.\par

## 2022-04-06 ENCOUNTER — APPOINTMENT (OUTPATIENT)
Dept: INTERNAL MEDICINE | Facility: CLINIC | Age: 77
End: 2022-04-06
Payer: MEDICARE

## 2022-04-06 ENCOUNTER — APPOINTMENT (OUTPATIENT)
Dept: PULMONOLOGY | Facility: CLINIC | Age: 77
End: 2022-04-06
Payer: MEDICARE

## 2022-04-06 VITALS
OXYGEN SATURATION: 92 % | HEIGHT: 66 IN | WEIGHT: 231 LBS | BODY MASS INDEX: 37.12 KG/M2 | DIASTOLIC BLOOD PRESSURE: 80 MMHG | SYSTOLIC BLOOD PRESSURE: 132 MMHG | HEART RATE: 84 BPM

## 2022-04-06 VITALS
SYSTOLIC BLOOD PRESSURE: 130 MMHG | TEMPERATURE: 96.7 F | HEART RATE: 82 BPM | BODY MASS INDEX: 37.12 KG/M2 | DIASTOLIC BLOOD PRESSURE: 80 MMHG | WEIGHT: 231 LBS | HEIGHT: 66 IN | OXYGEN SATURATION: 94 %

## 2022-04-06 VITALS — TEMPERATURE: 97.1 F

## 2022-04-06 PROCEDURE — 99214 OFFICE O/P EST MOD 30 MIN: CPT | Mod: 25

## 2022-04-06 PROCEDURE — 36415 COLL VENOUS BLD VENIPUNCTURE: CPT

## 2022-04-06 PROCEDURE — 99214 OFFICE O/P EST MOD 30 MIN: CPT

## 2022-04-06 NOTE — REVIEW OF SYSTEMS
[Nasal Congestion] : nasal congestion [Edema] : edema [Nocturia] : nocturia [Arthralgias] : arthralgias [Chronic Pain] : chronic pain [Depression] : depression [Anxiety] : anxiety [Obesity] : obesity [Negative] : Neurologic [TextBox_30] : see HPI

## 2022-04-06 NOTE — HISTORY OF PRESENT ILLNESS
[TextBox_4] : 76 year old female with COPD, respiratory failure, on NIV at home came for f/u\par Last seen in February 2022\par \par She feels OK. No exacerbations since the last visit.\par No hospital admissions.\par Has less cough, no hemoptysis.\par Uses NIV and feels comfortable with it\par Thinks that it is low pressure but she compares it with the one on the hospital which had a fixed pressure\par No mask problems\par Compliant with Trelegy\par Does not smoke cigarettes anymore, smokes e cigarettes\par She was afraid to try Chantix due to side effects.\par \par

## 2022-04-06 NOTE — HISTORY OF PRESENT ILLNESS
[FreeTextEntry1] : Routine two-month followup [de-identified] : Patient states she stopped smoking 2 months ago. She is doing quite a bit better. No ER visits or hospitalizations. She uses oxygen on a p.r.n. basis. She uses nocturnal noninvasive ventilation and night. She has occasional cough but denies significant sputum production. She uses nebulizer p.r.n. She feels her respiratory status is a bit better since stopping smoking. She has chronic shoulder back and knee pain due to osteoarthritis for which she sees pain management. She recently received an injection in her shoulder and her knee. She was also placed on Mobic. She remains quite awake and alert not confused at all. She is off Topamax. She does take Synthroid 75 mics daily. Labs will be drawn

## 2022-04-06 NOTE — PHYSICAL EXAM
[No Acute Distress] : no acute distress [II] : Mallampati Class: II [Normal Appearance] : normal appearance [No Neck Mass] : no neck mass [Normal Rate/Rhythm] : normal rate/rhythm [Normal S1, S2] : normal s1, s2 [No Resp Distress] : no resp distress [Clear to Auscultation Bilaterally] : clear to auscultation bilaterally [No Abnormalities] : no abnormalities [No Clubbing] : no clubbing [No Cyanosis] : no cyanosis [No Focal Deficits] : no focal deficits [Oriented x3] : oriented x3 [Normal Affect] : normal affect

## 2022-04-06 NOTE — REVIEW OF SYSTEMS
Hold aspirin and Xarelto x 1 week.  We can re-assess hematuria next week prior to deciding if he can restart Xarelto.  No need to restart aspirin for now.     [Cough] : cough [Dyspnea on Exertion] : dyspnea on exertion [Back Pain] : back pain [Negative] : Heme/Lymph

## 2022-04-12 LAB
ALBUMIN SERPL ELPH-MCNC: 4.7 G/DL
ALP BLD-CCNC: 93 U/L
ALT SERPL-CCNC: 27 U/L
ANION GAP SERPL CALC-SCNC: 12 MMOL/L
AST SERPL-CCNC: 24 U/L
BASOPHILS # BLD AUTO: 0.06 K/UL
BASOPHILS NFR BLD AUTO: 1 %
BILIRUB SERPL-MCNC: 0.3 MG/DL
BUN SERPL-MCNC: 38 MG/DL
CALCIUM SERPL-MCNC: 10.4 MG/DL
CHLORIDE SERPL-SCNC: 98 MMOL/L
CHOLEST SERPL-MCNC: 203 MG/DL
CO2 SERPL-SCNC: 26 MMOL/L
CREAT SERPL-MCNC: 0.91 MG/DL
EGFR: 66 ML/MIN/1.73M2
EOSINOPHIL # BLD AUTO: 0.18 K/UL
EOSINOPHIL NFR BLD AUTO: 2.9 %
ESTIMATED AVERAGE GLUCOSE: 131 MG/DL
GLUCOSE SERPL-MCNC: 110 MG/DL
HBA1C MFR BLD HPLC: 6.2 %
HCT VFR BLD CALC: 41.4 %
HDLC SERPL-MCNC: 98 MG/DL
HGB BLD-MCNC: 12.9 G/DL
IMM GRANULOCYTES NFR BLD AUTO: 0.2 %
LDLC SERPL CALC-MCNC: 90 MG/DL
LYMPHOCYTES # BLD AUTO: 1.01 K/UL
LYMPHOCYTES NFR BLD AUTO: 16 %
MAN DIFF?: NORMAL
MCHC RBC-ENTMCNC: 29.3 PG
MCHC RBC-ENTMCNC: 31.2 GM/DL
MCV RBC AUTO: 93.9 FL
MONOCYTES # BLD AUTO: 0.45 K/UL
MONOCYTES NFR BLD AUTO: 7.1 %
NEUTROPHILS # BLD AUTO: 4.6 K/UL
NEUTROPHILS NFR BLD AUTO: 72.8 %
NONHDLC SERPL-MCNC: 105 MG/DL
PLATELET # BLD AUTO: 217 K/UL
POTASSIUM SERPL-SCNC: 5.3 MMOL/L
PROT SERPL-MCNC: 7.2 G/DL
RBC # BLD: 4.41 M/UL
RBC # FLD: 15.9 %
SODIUM SERPL-SCNC: 136 MMOL/L
TRIGL SERPL-MCNC: 75 MG/DL
TSH SERPL-ACNC: 0.45 UIU/ML
WBC # FLD AUTO: 6.31 K/UL

## 2022-04-15 ENCOUNTER — APPOINTMENT (OUTPATIENT)
Dept: PAIN MANAGEMENT | Facility: CLINIC | Age: 77
End: 2022-04-15

## 2022-04-26 ENCOUNTER — RESULT REVIEW (OUTPATIENT)
Age: 77
End: 2022-04-26

## 2022-04-27 ENCOUNTER — TRANSCRIPTION ENCOUNTER (OUTPATIENT)
Age: 77
End: 2022-04-27

## 2022-04-27 ENCOUNTER — APPOINTMENT (OUTPATIENT)
Dept: PAIN MANAGEMENT | Facility: HOSPITAL | Age: 77
End: 2022-04-27

## 2022-05-04 ENCOUNTER — TRANSCRIPTION ENCOUNTER (OUTPATIENT)
Age: 77
End: 2022-05-04

## 2022-05-04 ENCOUNTER — APPOINTMENT (OUTPATIENT)
Dept: PAIN MANAGEMENT | Facility: HOSPITAL | Age: 77
End: 2022-05-04

## 2022-05-12 ENCOUNTER — APPOINTMENT (OUTPATIENT)
Dept: PAIN MANAGEMENT | Facility: HOSPITAL | Age: 77
End: 2022-05-12

## 2022-05-12 ENCOUNTER — TRANSCRIPTION ENCOUNTER (OUTPATIENT)
Age: 77
End: 2022-05-12

## 2022-05-18 ENCOUNTER — RESULT REVIEW (OUTPATIENT)
Age: 77
End: 2022-05-18

## 2022-05-31 ENCOUNTER — APPOINTMENT (OUTPATIENT)
Dept: HEART AND VASCULAR | Facility: CLINIC | Age: 77
End: 2022-05-31
Payer: MEDICARE

## 2022-05-31 ENCOUNTER — NON-APPOINTMENT (OUTPATIENT)
Age: 77
End: 2022-05-31

## 2022-05-31 VITALS
WEIGHT: 222 LBS | BODY MASS INDEX: 35.68 KG/M2 | HEIGHT: 66 IN | HEART RATE: 67 BPM | DIASTOLIC BLOOD PRESSURE: 60 MMHG | SYSTOLIC BLOOD PRESSURE: 120 MMHG

## 2022-05-31 PROCEDURE — 93000 ELECTROCARDIOGRAM COMPLETE: CPT

## 2022-05-31 PROCEDURE — 99406 BEHAV CHNG SMOKING 3-10 MIN: CPT

## 2022-05-31 PROCEDURE — 99495 TRANSJ CARE MGMT MOD F2F 14D: CPT

## 2022-05-31 RX ORDER — MELOXICAM 7.5 MG/1
7.5 TABLET ORAL
Qty: 30 | Refills: 0 | Status: DISCONTINUED | COMMUNITY
Start: 2022-03-18 | End: 2022-05-31

## 2022-05-31 RX ORDER — MUPIROCIN 20 MG/G
2 OINTMENT TOPICAL
Qty: 1 | Refills: 5 | Status: ACTIVE | COMMUNITY
Start: 2022-05-31 | End: 1900-01-01

## 2022-05-31 RX ORDER — FLUTICASONE PROPIONATE AND SALMETEROL 250; 50 UG/1; UG/1
250-50 POWDER RESPIRATORY (INHALATION)
Qty: 1 | Refills: 0 | Status: DISCONTINUED | COMMUNITY
Start: 2021-07-07 | End: 2022-05-31

## 2022-05-31 RX ORDER — UMECLIDINIUM 62.5 UG/1
62.5 AEROSOL, POWDER ORAL DAILY
Qty: 1 | Refills: 4 | Status: DISCONTINUED | COMMUNITY
Start: 2021-07-07 | End: 2022-05-31

## 2022-05-31 RX ORDER — NICOTINE 4 MG/1
10 INHALANT RESPIRATORY (INHALATION)
Qty: 1 | Refills: 1 | Status: DISCONTINUED | COMMUNITY
Start: 2021-10-25 | End: 2022-05-31

## 2022-05-31 RX ORDER — SPIRONOLACTONE AND HYDROCHLOROTHIAZIDE 25; 25 MG/1; MG/1
25-25 TABLET, FILM COATED ORAL DAILY
Qty: 90 | Refills: 3 | Status: DISCONTINUED | COMMUNITY
Start: 2022-01-25 | End: 2022-05-31

## 2022-05-31 RX ORDER — FLUTICASONE FUROATE, UMECLIDINIUM BROMIDE AND VILANTEROL TRIFENATATE 200; 62.5; 25 UG/1; UG/1; UG/1
200-62.5-25 POWDER RESPIRATORY (INHALATION) DAILY
Qty: 1 | Refills: 3 | Status: ACTIVE | COMMUNITY
Start: 2022-02-16 | End: 1900-01-01

## 2022-05-31 RX ORDER — SPIRONOLACTONE AND HYDROCHLOROTHIAZIDE 25; 25 MG/1; MG/1
25-25 TABLET, FILM COATED ORAL DAILY
Refills: 0 | Status: DISCONTINUED | COMMUNITY
Start: 2021-07-08 | End: 2022-05-31

## 2022-05-31 RX ORDER — MUPIROCIN 20 MG/G
2 OINTMENT TOPICAL
Qty: 1 | Refills: 5 | Status: DISCONTINUED | COMMUNITY
Start: 2022-03-14 | End: 2022-05-31

## 2022-05-31 RX ORDER — DOCUSATE SODIUM 100 MG/1
100 CAPSULE, LIQUID FILLED ORAL
Refills: 0 | Status: DISCONTINUED | COMMUNITY
End: 2022-05-31

## 2022-05-31 RX ORDER — PRIMIDONE 50 MG/1
50 TABLET ORAL
Refills: 0 | Status: DISCONTINUED | COMMUNITY
End: 2022-05-31

## 2022-05-31 NOTE — HISTORY OF PRESENT ILLNESS
[FreeTextEntry1] : 76 F diastolic HF,  COPD on home O2 3-4L, heavy smoker, former EtOH abuse (quit Nov 2018), HTN, HLD, recent admission to [facility] 5/16/22 with palpitations and QUEZADA found to be in rapid afib and pulmonary edema.  Pt was diuresed, converted to NSR.  ECHO showed diastolic dysfunction.  LE venous duplex neg for DVT. Discharged 5/18/22 on lasix 20mg, eliquis 5mg bid, metoprolol.  \par \par Since discharge breathing has gotten a lot better, is also more compliant now with the nasal cpap.  Compliant with meds.  No chest pain.  No leg swelling.  No further palps. Uses walker. Has for years slept in a recliner due to orthopnea and for ease of use with her cpap machine.

## 2022-05-31 NOTE — ASSESSMENT
[FreeTextEntry1] : 75 yo F\par \par Atrial fibrillation - dx on May 2022 admission to [place].  In sinus today, no palps.\par Diastolic heart failure\par HTN - controlled\par HLD -\par Heavy Smoker\par COPD\par \par EKG today NSR, \par Echo (may 2022) - normal LV size and function - LVH 1.4cm. elevated left atrial pressures. LA is dilated 47 ml/m2. No evidence of pulmonary hypertension. Dilated RV with normal function.\par \par - check labs today\par - repeat cxr \par - continue lasix 20mg, metoprolol 50mg bid, eliquis 5mg bid, crestor 40mg, lisinopril 10mg

## 2022-05-31 NOTE — PHYSICAL EXAM
[Well Developed] : well developed [Well Nourished] : well nourished [No Acute Distress] : no acute distress [Normal Conjunctiva] : normal conjunctiva [Normal Venous Pressure] : normal venous pressure [No Carotid Bruit] : no carotid bruit [Normal S1, S2] : normal S1, S2 [No Murmur] : no murmur [No Rub] : no rub [No Gallop] : no gallop [Clear Lung Fields] : clear lung fields [Good Air Entry] : good air entry [No Respiratory Distress] : no respiratory distress  [Soft] : abdomen soft [Non Tender] : non-tender [No Masses/organomegaly] : no masses/organomegaly [Normal Bowel Sounds] : normal bowel sounds [Moves all extremities] : moves all extremities [No Focal Deficits] : no focal deficits [Normal Speech] : normal speech [No ulcers] : no ulcers [No edema] : no edema [No varicosities] : no varicosities [No chronic venous stasis changes] : no chronic venous stasis changes [No cyanosis] : no cyanosis [No rashes] : no rashes [Normal peripheral pulses] : : normal peripheral pulses

## 2022-06-03 LAB
ALBUMIN SERPL ELPH-MCNC: 4.3 G/DL
ALP BLD-CCNC: 101 U/L
ALT SERPL-CCNC: 16 U/L
ANION GAP SERPL CALC-SCNC: 14 MMOL/L
AST SERPL-CCNC: 19 U/L
BASOPHILS # BLD AUTO: 0.06 K/UL
BASOPHILS NFR BLD AUTO: 0.9 %
BILIRUB SERPL-MCNC: <0.2 MG/DL
BUN SERPL-MCNC: 22 MG/DL
CALCIUM SERPL-MCNC: 9.4 MG/DL
CHLORIDE SERPL-SCNC: 102 MMOL/L
CHOLEST SERPL-MCNC: 162 MG/DL
CO2 SERPL-SCNC: 26 MMOL/L
CREAT SERPL-MCNC: 0.88 MG/DL
EGFR: 69 ML/MIN/1.73M2
EOSINOPHIL # BLD AUTO: 0.28 K/UL
EOSINOPHIL NFR BLD AUTO: 4.2 %
ESTIMATED AVERAGE GLUCOSE: 123 MG/DL
GLUCOSE SERPL-MCNC: 120 MG/DL
HBA1C MFR BLD HPLC: 5.9 %
HCT VFR BLD CALC: 40.8 %
HDLC SERPL-MCNC: 67 MG/DL
HGB BLD-MCNC: 13 G/DL
IMM GRANULOCYTES NFR BLD AUTO: 0.3 %
LDLC SERPL CALC-MCNC: 65 MG/DL
LYMPHOCYTES # BLD AUTO: 1.15 K/UL
LYMPHOCYTES NFR BLD AUTO: 17.2 %
MAGNESIUM SERPL-MCNC: 1.8 MG/DL
MAN DIFF?: NORMAL
MCHC RBC-ENTMCNC: 31 PG
MCHC RBC-ENTMCNC: 31.9 GM/DL
MCV RBC AUTO: 97.4 FL
MONOCYTES # BLD AUTO: 0.48 K/UL
MONOCYTES NFR BLD AUTO: 7.2 %
NEUTROPHILS # BLD AUTO: 4.69 K/UL
NEUTROPHILS NFR BLD AUTO: 70.2 %
NONHDLC SERPL-MCNC: 95 MG/DL
NT-PROBNP SERPL-MCNC: 157 PG/ML
PLATELET # BLD AUTO: 241 K/UL
POTASSIUM SERPL-SCNC: 4 MMOL/L
PROT SERPL-MCNC: 6.5 G/DL
RBC # BLD: 4.19 M/UL
RBC # FLD: 14.8 %
SODIUM SERPL-SCNC: 142 MMOL/L
TRIGL SERPL-MCNC: 151 MG/DL
WBC # FLD AUTO: 6.68 K/UL

## 2022-06-16 ENCOUNTER — TRANSCRIPTION ENCOUNTER (OUTPATIENT)
Age: 77
End: 2022-06-16

## 2022-06-22 ENCOUNTER — RESULT REVIEW (OUTPATIENT)
Age: 77
End: 2022-06-22

## 2022-07-16 ENCOUNTER — TRANSCRIPTION ENCOUNTER (OUTPATIENT)
Age: 77
End: 2022-07-16

## 2022-07-27 ENCOUNTER — APPOINTMENT (OUTPATIENT)
Dept: INTERNAL MEDICINE | Facility: CLINIC | Age: 77
End: 2022-07-27

## 2022-07-27 VITALS
SYSTOLIC BLOOD PRESSURE: 130 MMHG | OXYGEN SATURATION: 91 % | TEMPERATURE: 96.8 F | HEIGHT: 66 IN | DIASTOLIC BLOOD PRESSURE: 70 MMHG | HEART RATE: 82 BPM

## 2022-07-27 PROCEDURE — 99214 OFFICE O/P EST MOD 30 MIN: CPT

## 2022-07-27 NOTE — PHYSICAL EXAM
[No Acute Distress] : no acute distress [Well Nourished] : well nourished [Well Developed] : well developed [Well-Appearing] : well-appearing [Normal Sclera/Conjunctiva] : normal sclera/conjunctiva [PERRL] : pupils equal round and reactive to light [EOMI] : extraocular movements intact [Normal Outer Ear/Nose] : the outer ears and nose were normal in appearance [Normal Oropharynx] : the oropharynx was normal [No JVD] : no jugular venous distention [No Lymphadenopathy] : no lymphadenopathy [Supple] : supple [Thyroid Normal, No Nodules] : the thyroid was normal and there were no nodules present [No Respiratory Distress] : no respiratory distress  [No Accessory Muscle Use] : no accessory muscle use [Clear to Auscultation] : lungs were clear to auscultation bilaterally [Normal Rate] : normal rate  [Regular Rhythm] : with a regular rhythm [Normal S1, S2] : normal S1 and S2 [No Murmur] : no murmur heard [No Carotid Bruits] : no carotid bruits [No Abdominal Bruit] : a ~M bruit was not heard ~T in the abdomen [No Varicosities] : no varicosities [Pedal Pulses Present] : the pedal pulses are present [No Palpable Aorta] : no palpable aorta [No Extremity Clubbing/Cyanosis] : no extremity clubbing/cyanosis [Soft] : abdomen soft [Non Tender] : non-tender [Non-distended] : non-distended [No Masses] : no abdominal mass palpated [No HSM] : no HSM [Normal Bowel Sounds] : normal bowel sounds [Normal Posterior Cervical Nodes] : no posterior cervical lymphadenopathy [Normal Anterior Cervical Nodes] : no anterior cervical lymphadenopathy [No Focal Deficits] : no focal deficits [Normal Gait] : normal gait [Normal Affect] : the affect was normal [Normal Insight/Judgement] : insight and judgment were intact [de-identified] : 2+ pretibial edema with erythema 1/2 up pretibial surface bilateral

## 2022-07-27 NOTE — HISTORY OF PRESENT ILLNESS
[FreeTextEntry1] : Lower extremity edema [de-identified] : Patient with history of very severe COPD who continues to smoke one pack per day. She is on home oxygen and uses an N IV at night. She is mostly chair bound. Yesterday she was noted by the visiting nurse to have swollen erythematous legs. She became concerned and called the office. Patient states she's had lower extremity edema for a long time and the redness seems to come and go. The legs and not painful. There is no fever or chills.

## 2022-07-27 NOTE — ASSESSMENT
[FreeTextEntry1] : Patient with chronic lower extremity edema and erythema. I feel this is due to venous insufficiency. Cannot rule out an element of pulmonary hypertension. I do not believe the legs her infected. I have explained to the patient that she must keep the legs elevated as much as possible. She usually sleeps in a recliner with her legs down. Patient has been reassured. Have again discussed with the patient the need to stop smoking. Current O2 sat 88 on 3 L pulsed oxygen flow

## 2022-08-03 ENCOUNTER — RESULT REVIEW (OUTPATIENT)
Age: 77
End: 2022-08-03

## 2022-08-05 ENCOUNTER — TRANSCRIPTION ENCOUNTER (OUTPATIENT)
Age: 77
End: 2022-08-05

## 2022-08-06 ENCOUNTER — TRANSCRIPTION ENCOUNTER (OUTPATIENT)
Age: 77
End: 2022-08-06

## 2022-08-10 ENCOUNTER — NON-APPOINTMENT (OUTPATIENT)
Age: 77
End: 2022-08-10

## 2022-08-12 ENCOUNTER — APPOINTMENT (OUTPATIENT)
Dept: INTERNAL MEDICINE | Facility: CLINIC | Age: 77
End: 2022-08-12

## 2022-08-17 ENCOUNTER — NON-APPOINTMENT (OUTPATIENT)
Age: 77
End: 2022-08-17

## 2022-08-17 RX ORDER — ALBUTEROL SULFATE 2.5 MG/3ML
(2.5 MG/3ML) SOLUTION RESPIRATORY (INHALATION)
Qty: 225 | Refills: 3 | Status: ACTIVE | COMMUNITY
Start: 2021-12-15 | End: 1900-01-01

## 2022-08-18 ENCOUNTER — NON-APPOINTMENT (OUTPATIENT)
Age: 77
End: 2022-08-18

## 2022-08-30 ENCOUNTER — APPOINTMENT (OUTPATIENT)
Dept: HEART AND VASCULAR | Facility: CLINIC | Age: 77
End: 2022-08-30

## 2022-08-30 ENCOUNTER — NON-APPOINTMENT (OUTPATIENT)
Age: 77
End: 2022-08-30

## 2022-08-30 VITALS — SYSTOLIC BLOOD PRESSURE: 120 MMHG | HEART RATE: 79 BPM | DIASTOLIC BLOOD PRESSURE: 60 MMHG

## 2022-08-30 PROCEDURE — 99214 OFFICE O/P EST MOD 30 MIN: CPT | Mod: 25

## 2022-08-30 PROCEDURE — 93000 ELECTROCARDIOGRAM COMPLETE: CPT

## 2022-08-30 RX ORDER — VARENICLINE 0.5 MG/1
0.5 TABLET, FILM COATED ORAL
Qty: 11 | Refills: 0 | Status: COMPLETED | COMMUNITY
Start: 2022-02-11 | End: 2022-08-30

## 2022-08-30 RX ORDER — SPIRONOLACTONE AND HYDROCHLOROTHIAZIDE 25; 25 MG/1; MG/1
25-25 TABLET, FILM COATED ORAL DAILY
Qty: 90 | Refills: 3 | Status: COMPLETED | COMMUNITY
Start: 2022-05-31 | End: 2022-08-30

## 2022-08-30 RX ORDER — FUROSEMIDE 20 MG/1
20 TABLET ORAL
Qty: 90 | Refills: 0 | Status: DISCONTINUED | COMMUNITY
Start: 2019-11-14 | End: 2022-08-30

## 2022-08-30 RX ORDER — VARENICLINE 1 MG/1
1 TABLET, FILM COATED ORAL
Qty: 6 | Refills: 0 | Status: COMPLETED | COMMUNITY
Start: 2022-02-18 | End: 2022-08-30

## 2022-08-30 RX ORDER — VARENICLINE 1 MG/1
1 TABLET, FILM COATED ORAL TWICE DAILY
Qty: 60 | Refills: 2 | Status: COMPLETED | COMMUNITY
Start: 2022-02-11 | End: 2022-08-30

## 2022-08-30 NOTE — PHYSICAL EXAM
[Well Developed] : well developed [Well Nourished] : well nourished [No Acute Distress] : no acute distress [Normal Conjunctiva] : normal conjunctiva [Normal Venous Pressure] : normal venous pressure [No Carotid Bruit] : no carotid bruit [Normal S1, S2] : normal S1, S2 [No Murmur] : no murmur [No Rub] : no rub [No Gallop] : no gallop [Clear Lung Fields] : clear lung fields [Good Air Entry] : good air entry [No Respiratory Distress] : no respiratory distress  [Soft] : abdomen soft [Non Tender] : non-tender [No Masses/organomegaly] : no masses/organomegaly [Normal Bowel Sounds] : normal bowel sounds [Moves all extremities] : moves all extremities [No Focal Deficits] : no focal deficits [Normal Speech] : normal speech [No ulcers] : no ulcers [No edema] : no edema [No varicosities] : no varicosities [No chronic venous stasis changes] : no chronic venous stasis changes [No cyanosis] : no cyanosis [No rashes] : no rashes [Normal peripheral pulses] : : normal peripheral pulses [Wheeze ____] : wheeze [unfilled]

## 2022-08-30 NOTE — HISTORY OF PRESENT ILLNESS
[FreeTextEntry1] : 76 F diastolic HF,  COPD on home O2 3-4L, heavy smoker, former EtOH abuse (quit Nov 2018), HTN, HLD, recent admission to [place] 5/16/22 with palpitations and QUEZADA found to be in rapid afib and pulmonary edema.  Pt was diuresed, converted to NSR.  ECHO showed diastolic dysfunction.  LE venous duplex neg for DVT. Discharged 5/18/22 on lasix 20mg, eliquis 5mg bid, metoprolol.  \par \par Since discharge breathing has gotten a lot better, is also more compliant now with the nasal cpap.  Compliant with meds.  No chest pain.  No leg swelling.  No further palps. Uses walker. Has for years slept in a recliner due to orthopnea and for ease of use with her cpap machine.   \par \par 8/30/22: recently admitted in August for COPD exacerbation.  Today pt feeling depressed, stressed.  Had big fight with .  She is smoking cigarettes again 3/4 pack per day.  On 3L NC continuously.  No chest pain or palps.  Walks very little, only around the apt.  Compliant with meds.

## 2022-08-30 NOTE — REVIEW OF SYSTEMS
[Dyspnea on exertion] : dyspnea during exertion [Negative] : Heme/Lymph if you have a fever greater than 100.4 degrees fahrenheit, 10/10 pain, or are soaking more than 2 pads in one hour, call your doctor or come to the hospital. Follow up in 6 week for postpartum visit.

## 2022-08-30 NOTE — ASSESSMENT
[FreeTextEntry1] : 75 yo F\par \par Atrial fibrillation - dx on May 2022 admission to [facility].  In sinus today. \par Diastolic heart failure\par HTN - controlled\par HLD -\par Heavy Smoker\par COPD on home O2\par \par EKG today NSR, LVH\par CXR (aug 3, 2022) - there has been a decrease in pulmonary vascular and interstitial prominence.\par Chest CT (july 2022) - Dilated main pulmonary artery. Coronary artery and aortic root calcifications. \par CXR (june 2022) - probable mild diffuse interstitial edema. \par Echo (may 2022) - normal LV size and function - LVH 1.4cm. elevated left atrial pressures. LA is dilated 47 ml/m2. No evidence of pulmonary hypertension. Dilated RV with normal function.\par \par Recent hospitalization records reviewed.\par \par - stable CV status, in sinus rhythm, no signs of volume overload\par - continue lasix 40mg, metoprolol 50mg bid, eliquis 5mg bid, crestor 40mg, lisinopril 10mg \par - supplemental O2, sat 90% on portable machine 2L, diffuse inspiratory wheezing on exam

## 2022-09-02 ENCOUNTER — APPOINTMENT (OUTPATIENT)
Dept: INTERNAL MEDICINE | Facility: CLINIC | Age: 77
End: 2022-09-02

## 2022-09-02 VITALS
HEART RATE: 81 BPM | SYSTOLIC BLOOD PRESSURE: 110 MMHG | HEIGHT: 66 IN | OXYGEN SATURATION: 92 % | DIASTOLIC BLOOD PRESSURE: 60 MMHG | TEMPERATURE: 96.7 F | BODY MASS INDEX: 35.68 KG/M2 | WEIGHT: 222 LBS

## 2022-09-02 PROCEDURE — 99214 OFFICE O/P EST MOD 30 MIN: CPT

## 2022-09-02 NOTE — ASSESSMENT
[FreeTextEntry1] : Patient's respiratory status seems a bit better since increasing the Lasix to 40 mg daily. Her main problems seems to be anxiety and depression. Patient will followup with psychiatry next week. Patient is advised regarding the need to stop smoking. She is also recommended that she followup with Dr. Correa.

## 2022-09-02 NOTE — PHYSICAL EXAM
[No Acute Distress] : no acute distress [Well Nourished] : well nourished [Well Developed] : well developed [Well-Appearing] : well-appearing [Normal Sclera/Conjunctiva] : normal sclera/conjunctiva [PERRL] : pupils equal round and reactive to light [EOMI] : extraocular movements intact [Normal Outer Ear/Nose] : the outer ears and nose were normal in appearance [Normal Oropharynx] : the oropharynx was normal [No JVD] : no jugular venous distention [No Lymphadenopathy] : no lymphadenopathy [Supple] : supple [Thyroid Normal, No Nodules] : the thyroid was normal and there were no nodules present [No Respiratory Distress] : no respiratory distress  [No Accessory Muscle Use] : no accessory muscle use [Clear to Auscultation] : lungs were clear to auscultation bilaterally [Normal Rate] : normal rate  [Regular Rhythm] : with a regular rhythm [Normal S1, S2] : normal S1 and S2 [No Murmur] : no murmur heard [No Carotid Bruits] : no carotid bruits [No Abdominal Bruit] : a ~M bruit was not heard ~T in the abdomen [No Varicosities] : no varicosities [Pedal Pulses Present] : the pedal pulses are present [No Palpable Aorta] : no palpable aorta [No Extremity Clubbing/Cyanosis] : no extremity clubbing/cyanosis [Soft] : abdomen soft [Non Tender] : non-tender [Non-distended] : non-distended [No Masses] : no abdominal mass palpated [No HSM] : no HSM [Normal Bowel Sounds] : normal bowel sounds [Normal Posterior Cervical Nodes] : no posterior cervical lymphadenopathy [Normal Anterior Cervical Nodes] : no anterior cervical lymphadenopathy [No Focal Deficits] : no focal deficits [Normal Gait] : normal gait [Normal Affect] : the affect was normal [Normal Insight/Judgement] : insight and judgment were intact [de-identified] : 2+ pretibial edema

## 2022-09-02 NOTE — HISTORY OF PRESENT ILLNESS
[FreeTextEntry1] : Followup after recent hospitalization 3 weeks ago [de-identified] : The patient was hospitalized about 3 weeks ago with increasing shortness of breath. She had increased interstitial markings on chest x-ray was felt to have diastolic congestive heart failure. Lasix was increased to 40 mg daily. She is feeling a bit better. However she is under a lot of stress with issues regarding her . They had a big fight about 2 weeks ago and she seems very upset. She is back to smoking a half pack per day. She is not drinking. She uses oxygen all day. She does not use nocturnal noninvasive ventilation. Her main problem at this point seems to be her underlying depression and anxiety regarding her relationship to her . She has appointment with psychiatry next week. Her medications were reviewed.

## 2022-09-15 ENCOUNTER — NON-APPOINTMENT (OUTPATIENT)
Age: 77
End: 2022-09-15

## 2022-09-19 ENCOUNTER — RX RENEWAL (OUTPATIENT)
Age: 77
End: 2022-09-19

## 2022-09-22 ENCOUNTER — NON-APPOINTMENT (OUTPATIENT)
Age: 77
End: 2022-09-22

## 2022-09-29 ENCOUNTER — APPOINTMENT (OUTPATIENT)
Dept: PULMONOLOGY | Facility: CLINIC | Age: 77
End: 2022-09-29
Payer: MEDICARE

## 2022-09-29 VITALS
HEIGHT: 66 IN | WEIGHT: 222 LBS | BODY MASS INDEX: 35.68 KG/M2 | TEMPERATURE: 97.9 F | HEART RATE: 67 BPM | OXYGEN SATURATION: 93 %

## 2022-09-29 VITALS — SYSTOLIC BLOOD PRESSURE: 130 MMHG | DIASTOLIC BLOOD PRESSURE: 70 MMHG

## 2022-09-29 PROCEDURE — 99214 OFFICE O/P EST MOD 30 MIN: CPT

## 2022-09-29 NOTE — PHYSICAL EXAM
[No Acute Distress] : no acute distress [Normal Appearance] : normal appearance [Normal Rate/Rhythm] : normal rate/rhythm [No Resp Distress] : no resp distress [Clear to Auscultation Bilaterally] : clear to auscultation bilaterally [Kyphosis] : kyphosis [No Clubbing] : no clubbing [No Cyanosis] : no cyanosis [1+ Pitting] : 1+ pitting [No Focal Deficits] : no focal deficits [Oriented x3] : oriented x3 [Normal Affect] : normal affect

## 2022-09-29 NOTE — REVIEW OF SYSTEMS
[SOB on Exertion] : sob on exertion [Edema] : edema [Depression] : depression [Negative] : Hematologic [TextBox_14] : vision and hearing problems

## 2022-09-29 NOTE — HISTORY OF PRESENT ILLNESS
[TextBox_4] : 77 year old female with COPD, chronic respiratory failure on NIV and O2 at home came in for f/u.\par Last seen in April 2022\par Last CXR with increased interstitial markings RtLL.\par \par Feels OK with O2.\par She is at 3.5 LPM\par Still smokes.\par She does not smoke with O2.\par When she walks without O2 her O2 saturation is low.\par She uses Symbicort instead of Trelegy- she did not renew the Trelegy.\par Sleeps well with the NIV.\par She feels that at times it does not give her a lot of pressure, but she is comfortable with it\par Last hospital admission in August\par No Prednisone use since then\par \par \par \par

## 2022-10-12 ENCOUNTER — TRANSCRIPTION ENCOUNTER (OUTPATIENT)
Age: 77
End: 2022-10-12

## 2022-10-18 ENCOUNTER — APPOINTMENT (OUTPATIENT)
Dept: INTERNAL MEDICINE | Facility: CLINIC | Age: 77
End: 2022-10-18

## 2022-10-18 VITALS
TEMPERATURE: 96.7 F | HEIGHT: 66 IN | SYSTOLIC BLOOD PRESSURE: 110 MMHG | BODY MASS INDEX: 35.68 KG/M2 | OXYGEN SATURATION: 91 % | DIASTOLIC BLOOD PRESSURE: 70 MMHG | HEART RATE: 68 BPM | WEIGHT: 222 LBS

## 2022-10-18 PROCEDURE — 99214 OFFICE O/P EST MOD 30 MIN: CPT

## 2022-10-18 NOTE — ASSESSMENT
[FreeTextEntry1] : Patient is doing relatively well. She appears in normal sinus rhythm. She is not smoking. Current O2 sat 89 on 3 L pulse flow. She is advised to continue her medical regimen and followup with doctor nena. She also is recommended to see pain management regarding her arthritic shoulder and knee.

## 2022-10-18 NOTE — PHYSICAL EXAM
[No Acute Distress] : no acute distress [Well Nourished] : well nourished [Well Developed] : well developed [Well-Appearing] : well-appearing [Normal Sclera/Conjunctiva] : normal sclera/conjunctiva [PERRL] : pupils equal round and reactive to light [EOMI] : extraocular movements intact [Normal Outer Ear/Nose] : the outer ears and nose were normal in appearance [Normal Oropharynx] : the oropharynx was normal [No JVD] : no jugular venous distention [No Lymphadenopathy] : no lymphadenopathy [Supple] : supple [Thyroid Normal, No Nodules] : the thyroid was normal and there were no nodules present [No Respiratory Distress] : no respiratory distress  [No Accessory Muscle Use] : no accessory muscle use [Clear to Auscultation] : lungs were clear to auscultation bilaterally [Normal Rate] : normal rate  [Regular Rhythm] : with a regular rhythm [Normal S1, S2] : normal S1 and S2 [No Murmur] : no murmur heard [No Carotid Bruits] : no carotid bruits [No Abdominal Bruit] : a ~M bruit was not heard ~T in the abdomen [No Varicosities] : no varicosities [Pedal Pulses Present] : the pedal pulses are present [No Palpable Aorta] : no palpable aorta [No Extremity Clubbing/Cyanosis] : no extremity clubbing/cyanosis [Soft] : abdomen soft [Non Tender] : non-tender [Non-distended] : non-distended [No Masses] : no abdominal mass palpated [No HSM] : no HSM [Normal Bowel Sounds] : normal bowel sounds [Normal Posterior Cervical Nodes] : no posterior cervical lymphadenopathy [Normal Anterior Cervical Nodes] : no anterior cervical lymphadenopathy [No Focal Deficits] : no focal deficits [Normal Gait] : normal gait [Normal Affect] : the affect was normal [Normal Insight/Judgement] : insight and judgment were intact [de-identified] : 1+ pretibial edema

## 2022-10-18 NOTE — HISTORY OF PRESENT ILLNESS
[FreeTextEntry1] : Followup recent hospitalization [de-identified] : Patient hospitalized one week ago with rapid atrial fibrillation. She apparently converted with amiodarone. She was discharged on her usual medical regimen. No changes I believe were made. She is doing fairly well not smoking for the past week. She is using nicotine lozenges and gum. She denies cough. She remains on continual home oxygen at 3 L and NIV. Her  is now in rehabilitation. She is doing fairly well overall. She has PT at home and VNA services.

## 2022-10-25 ENCOUNTER — RX RENEWAL (OUTPATIENT)
Age: 77
End: 2022-10-25

## 2022-11-08 ENCOUNTER — APPOINTMENT (OUTPATIENT)
Dept: HEART AND VASCULAR | Facility: CLINIC | Age: 77
End: 2022-11-08

## 2022-11-08 ENCOUNTER — NON-APPOINTMENT (OUTPATIENT)
Age: 77
End: 2022-11-08

## 2022-11-08 DIAGNOSIS — R60.0 LOCALIZED EDEMA: ICD-10-CM

## 2022-11-08 PROCEDURE — 93000 ELECTROCARDIOGRAM COMPLETE: CPT

## 2022-11-08 PROCEDURE — 99213 OFFICE O/P EST LOW 20 MIN: CPT | Mod: 25

## 2022-11-08 NOTE — ASSESSMENT
Patient discharged by provider. [FreeTextEntry1] : 78 yo F\par \par Atrial fibrillation - dx on May 2022 admission to [facility].  In sinus today. \par Diastolic heart failure\par HTN - controlled\par HLD -\par Heavy Smoker\par COPD on home O2\par \par EKG today NSR, APCs \par CXR (aug 3, 2022) - there has been a decrease in pulmonary vascular and interstitial prominence.\par Chest CT (july 2022) - Dilated main pulmonary artery. Coronary artery and aortic root calcifications. \par CXR (june 2022) - probable mild diffuse interstitial edema. \par Echo (may 2022) - normal LV size and function - LVH 1.4cm. elevated left atrial pressures. LA is dilated 47 ml/m2. No evidence of pulmonary hypertension. Dilated RV with normal function.\par \par - volume status is acceptable, wheezing resolved, good air entry, trace LE edema\par - continue torsemide 40mg, metoprolol 50mg bid, eliquis 5mg bid, crestor 40mg, lisinopril 10mg \par - continue supplemental O2, sat 91% on portable machine 2L in office today, generally on 3-4L at home \par - check renal function, lytes, BNP

## 2022-11-08 NOTE — PHYSICAL EXAM
[Well Developed] : well developed [Well Nourished] : well nourished [No Acute Distress] : no acute distress [Normal Conjunctiva] : normal conjunctiva [Normal Venous Pressure] : normal venous pressure [No Carotid Bruit] : no carotid bruit [Normal S1, S2] : normal S1, S2 [No Murmur] : no murmur [No Rub] : no rub [No Gallop] : no gallop [Clear Lung Fields] : clear lung fields [Good Air Entry] : good air entry [No Respiratory Distress] : no respiratory distress  [Wheeze ____] : wheeze [unfilled] [Soft] : abdomen soft [Non Tender] : non-tender [No Masses/organomegaly] : no masses/organomegaly [Normal Bowel Sounds] : normal bowel sounds [Moves all extremities] : moves all extremities [No Focal Deficits] : no focal deficits [Normal Speech] : normal speech [No ulcers] : no ulcers [No varicosities] : no varicosities [No chronic venous stasis changes] : no chronic venous stasis changes [No cyanosis] : no cyanosis [No rashes] : no rashes [Normal peripheral pulses] : : normal peripheral pulses [de-identified] : imporved air entry, no wheezing [de-identified] : trace edema

## 2022-11-08 NOTE — HISTORY OF PRESENT ILLNESS
[FreeTextEntry1] : 76 F diastolic HF,  COPD on home O2 3-4L, heavy smoker, former EtOH abuse (quit Nov 2018), HTN, HLD, recent admission to [facility] 5/16/22 with palpitations and QUEZADA found to be in rapid afib and pulmonary edema.  Pt was diuresed, converted to NSR.  ECHO showed diastolic dysfunction.  LE venous duplex neg for DVT. Discharged 5/18/22 on lasix 20mg, eliquis 5mg bid, metoprolol.  \par \par Since discharge breathing has gotten a lot better, is also more compliant now with the nasal cpap.  Compliant with meds.  No chest pain.  No leg swelling.  No further palps. Uses walker. Has for years slept in a recliner due to orthopnea and for ease of use with her cpap machine.   \par \par 8/30/22: recently admitted in August for COPD exacerbation.  Today pt feeling depressed, stressed.  Had big fight with .  She is smoking cigarettes again 3/4 pack per day.  On 3L NC continuously.  No chest pain or palps.  Walks very little, only around the apt.  Compliant with meds.   \par \par 11/8/22: walks around her apt, grocery store, feels most limited by knee/back arthritis.  does have some exertional dyspnea.  O2 requirements unchanged 3-4L. switched to torsemide 40mg daily and noted significant improvement in leg swelling and mild improvement in breathing.  \par

## 2022-11-10 LAB
ALBUMIN SERPL ELPH-MCNC: 4.6 G/DL
ALP BLD-CCNC: 113 U/L
ALT SERPL-CCNC: 21 U/L
ANION GAP SERPL CALC-SCNC: 11 MMOL/L
AST SERPL-CCNC: 20 U/L
BASOPHILS # BLD AUTO: 0.05 K/UL
BASOPHILS NFR BLD AUTO: 1 %
BILIRUB SERPL-MCNC: 0.2 MG/DL
BUN SERPL-MCNC: 16 MG/DL
CALCIUM SERPL-MCNC: 10.1 MG/DL
CHLORIDE SERPL-SCNC: 98 MMOL/L
CO2 SERPL-SCNC: 33 MMOL/L
CREAT SERPL-MCNC: 0.92 MG/DL
EGFR: 65 ML/MIN/1.73M2
EOSINOPHIL # BLD AUTO: 0.26 K/UL
EOSINOPHIL NFR BLD AUTO: 5.3 %
GLUCOSE SERPL-MCNC: 111 MG/DL
HCT VFR BLD CALC: 42.1 %
HGB BLD-MCNC: 13.1 G/DL
IMM GRANULOCYTES NFR BLD AUTO: 0 %
LYMPHOCYTES # BLD AUTO: 1.24 K/UL
LYMPHOCYTES NFR BLD AUTO: 25.5 %
MAGNESIUM SERPL-MCNC: 1.9 MG/DL
MAN DIFF?: NORMAL
MCHC RBC-ENTMCNC: 30 PG
MCHC RBC-ENTMCNC: 31.1 GM/DL
MCV RBC AUTO: 96.6 FL
MONOCYTES # BLD AUTO: 0.38 K/UL
MONOCYTES NFR BLD AUTO: 7.8 %
NEUTROPHILS # BLD AUTO: 2.93 K/UL
NEUTROPHILS NFR BLD AUTO: 60.4 %
NT-PROBNP SERPL-MCNC: 205 PG/ML
PLATELET # BLD AUTO: 238 K/UL
POTASSIUM SERPL-SCNC: 4.3 MMOL/L
PROT SERPL-MCNC: 7.1 G/DL
RBC # BLD: 4.36 M/UL
RBC # FLD: 15 %
SODIUM SERPL-SCNC: 142 MMOL/L
WBC # FLD AUTO: 4.86 K/UL

## 2022-11-20 ENCOUNTER — NON-APPOINTMENT (OUTPATIENT)
Age: 77
End: 2022-11-20

## 2022-11-23 ENCOUNTER — RX CHANGE (OUTPATIENT)
Age: 77
End: 2022-11-23

## 2022-11-23 RX ORDER — TORSEMIDE 20 MG/1
20 TABLET ORAL TWICE DAILY
Qty: 60 | Refills: 3 | Status: DISCONTINUED | COMMUNITY
Start: 2022-10-28 | End: 2022-11-23

## 2023-01-01 ENCOUNTER — RESULT REVIEW (OUTPATIENT)
Age: 78
End: 2023-01-01

## 2023-01-01 ENCOUNTER — NON-APPOINTMENT (OUTPATIENT)
Age: 78
End: 2023-01-01

## 2023-01-01 ENCOUNTER — TRANSCRIPTION ENCOUNTER (OUTPATIENT)
Age: 78
End: 2023-01-01

## 2023-01-01 ENCOUNTER — APPOINTMENT (OUTPATIENT)
Dept: PAIN MANAGEMENT | Facility: CLINIC | Age: 78
End: 2023-01-01
Payer: MEDICARE

## 2023-01-01 ENCOUNTER — LABORATORY RESULT (OUTPATIENT)
Age: 78
End: 2023-01-01

## 2023-01-01 ENCOUNTER — APPOINTMENT (OUTPATIENT)
Dept: PULMONOLOGY | Facility: CLINIC | Age: 78
End: 2023-01-01

## 2023-01-01 ENCOUNTER — APPOINTMENT (OUTPATIENT)
Dept: INTERNAL MEDICINE | Facility: CLINIC | Age: 78
End: 2023-01-01
Payer: MEDICARE

## 2023-01-01 ENCOUNTER — APPOINTMENT (OUTPATIENT)
Dept: GERIATRICS | Facility: HOME HEALTH | Age: 78
End: 2023-01-01
Payer: MEDICARE

## 2023-01-01 ENCOUNTER — APPOINTMENT (OUTPATIENT)
Dept: PULMONOLOGY | Facility: CLINIC | Age: 78
End: 2023-01-01
Payer: MEDICARE

## 2023-01-01 ENCOUNTER — APPOINTMENT (OUTPATIENT)
Dept: INTERNAL MEDICINE | Facility: CLINIC | Age: 78
End: 2023-01-01

## 2023-01-01 ENCOUNTER — APPOINTMENT (OUTPATIENT)
Dept: GERIATRICS | Facility: HOME HEALTH | Age: 78
End: 2023-01-01

## 2023-01-01 ENCOUNTER — RX RENEWAL (OUTPATIENT)
Age: 78
End: 2023-01-01

## 2023-01-01 VITALS
DIASTOLIC BLOOD PRESSURE: 68 MMHG | OXYGEN SATURATION: 90 % | WEIGHT: 215 LBS | WEIGHT: 214 LBS | SYSTOLIC BLOOD PRESSURE: 103 MMHG | HEART RATE: 91 BPM | BODY MASS INDEX: 35.65 KG/M2 | SYSTOLIC BLOOD PRESSURE: 147 MMHG | BODY MASS INDEX: 35.82 KG/M2 | DIASTOLIC BLOOD PRESSURE: 80 MMHG | HEIGHT: 65 IN | TEMPERATURE: 97 F | HEIGHT: 65 IN

## 2023-01-01 VITALS
DIASTOLIC BLOOD PRESSURE: 80 MMHG | OXYGEN SATURATION: 88 % | SYSTOLIC BLOOD PRESSURE: 132 MMHG | WEIGHT: 215 LBS | HEART RATE: 73 BPM | BODY MASS INDEX: 35.82 KG/M2 | HEIGHT: 65 IN | TEMPERATURE: 98.8 F

## 2023-01-01 VITALS
DIASTOLIC BLOOD PRESSURE: 79 MMHG | TEMPERATURE: 98 F | SYSTOLIC BLOOD PRESSURE: 134 MMHG | HEIGHT: 65 IN | WEIGHT: 215 LBS | BODY MASS INDEX: 35.82 KG/M2

## 2023-01-01 VITALS
WEIGHT: 215 LBS | HEIGHT: 65 IN | BODY MASS INDEX: 35.82 KG/M2 | DIASTOLIC BLOOD PRESSURE: 67 MMHG | TEMPERATURE: 96.8 F | SYSTOLIC BLOOD PRESSURE: 119 MMHG | OXYGEN SATURATION: 81 % | HEART RATE: 84 BPM

## 2023-01-01 VITALS
HEART RATE: 64 BPM | WEIGHT: 214.8 LBS | SYSTOLIC BLOOD PRESSURE: 125 MMHG | DIASTOLIC BLOOD PRESSURE: 80 MMHG | TEMPERATURE: 97.1 F | OXYGEN SATURATION: 95 % | BODY MASS INDEX: 35.74 KG/M2

## 2023-01-01 VITALS
WEIGHT: 214 LBS | HEIGHT: 65 IN | SYSTOLIC BLOOD PRESSURE: 95 MMHG | BODY MASS INDEX: 35.65 KG/M2 | DIASTOLIC BLOOD PRESSURE: 67 MMHG

## 2023-01-01 VITALS — DIASTOLIC BLOOD PRESSURE: 80 MMHG | SYSTOLIC BLOOD PRESSURE: 140 MMHG

## 2023-01-01 VITALS
DIASTOLIC BLOOD PRESSURE: 80 MMHG | TEMPERATURE: 98.3 F | WEIGHT: 215 LBS | HEIGHT: 65 IN | SYSTOLIC BLOOD PRESSURE: 146 MMHG | OXYGEN SATURATION: 88 % | BODY MASS INDEX: 35.82 KG/M2 | HEART RATE: 83 BPM

## 2023-01-01 VITALS
SYSTOLIC BLOOD PRESSURE: 120 MMHG | HEIGHT: 65 IN | TEMPERATURE: 98.4 F | DIASTOLIC BLOOD PRESSURE: 60 MMHG | OXYGEN SATURATION: 88 % | HEART RATE: 74 BPM

## 2023-01-01 VITALS
BODY MASS INDEX: 36.01 KG/M2 | OXYGEN SATURATION: 95 % | HEART RATE: 56 BPM | WEIGHT: 216.4 LBS | TEMPERATURE: 96.2 F | DIASTOLIC BLOOD PRESSURE: 70 MMHG | SYSTOLIC BLOOD PRESSURE: 122 MMHG

## 2023-01-01 DIAGNOSIS — M48.061 SPINAL STENOSIS, LUMBAR REGION WITHOUT NEUROGENIC CLAUDICATION: ICD-10-CM

## 2023-01-01 DIAGNOSIS — I10 ESSENTIAL (PRIMARY) HYPERTENSION: ICD-10-CM

## 2023-01-01 DIAGNOSIS — M17.11 UNILATERAL PRIMARY OSTEOARTHRITIS, RIGHT KNEE: ICD-10-CM

## 2023-01-01 DIAGNOSIS — Z09 ENCOUNTER FOR FOLLOW-UP EXAMINATION AFTER COMPLETED TREATMENT FOR CONDITIONS OTHER THAN MALIGNANT NEOPLASM: ICD-10-CM

## 2023-01-01 DIAGNOSIS — G89.4 CHRONIC PAIN SYNDROME: ICD-10-CM

## 2023-01-01 DIAGNOSIS — Z01.818 ENCOUNTER FOR OTHER PREPROCEDURAL EXAMINATION: ICD-10-CM

## 2023-01-01 DIAGNOSIS — E03.9 HYPOTHYROIDISM, UNSPECIFIED: ICD-10-CM

## 2023-01-01 DIAGNOSIS — Z87.898 PERSONAL HISTORY OF OTHER SPECIFIED CONDITIONS: ICD-10-CM

## 2023-01-01 DIAGNOSIS — H26.9 UNSPECIFIED CATARACT: ICD-10-CM

## 2023-01-01 DIAGNOSIS — R91.1 SOLITARY PULMONARY NODULE: ICD-10-CM

## 2023-01-01 DIAGNOSIS — B37.2 CANDIDIASIS OF SKIN AND NAIL: ICD-10-CM

## 2023-01-01 DIAGNOSIS — Z76.89 PERSONS ENCOUNTERING HEALTH SERVICES IN OTHER SPECIFIED CIRCUMSTANCES: ICD-10-CM

## 2023-01-01 DIAGNOSIS — Z23 ENCOUNTER FOR IMMUNIZATION: ICD-10-CM

## 2023-01-01 DIAGNOSIS — J96.12 CHRONIC RESPIRATORY FAILURE WITH HYPOXIA: ICD-10-CM

## 2023-01-01 DIAGNOSIS — J96.11 CHRONIC RESPIRATORY FAILURE WITH HYPOXIA: ICD-10-CM

## 2023-01-01 DIAGNOSIS — E55.9 VITAMIN D DEFICIENCY, UNSPECIFIED: ICD-10-CM

## 2023-01-01 DIAGNOSIS — J44.9 CHRONIC OBSTRUCTIVE PULMONARY DISEASE, UNSPECIFIED: ICD-10-CM

## 2023-01-01 DIAGNOSIS — I48.91 UNSPECIFIED ATRIAL FIBRILLATION: ICD-10-CM

## 2023-01-01 DIAGNOSIS — M19.019 PRIMARY OSTEOARTHRITIS, UNSPECIFIED SHOULDER: ICD-10-CM

## 2023-01-01 DIAGNOSIS — M47.817 SPONDYLOSIS W/OUT MYELOPATHY OR RADICULOPATHY, LUMBOSACRAL REGION: ICD-10-CM

## 2023-01-01 DIAGNOSIS — I50.32 CHRONIC DIASTOLIC (CONGESTIVE) HEART FAILURE: ICD-10-CM

## 2023-01-01 DIAGNOSIS — M75.51 BURSITIS OF RIGHT SHOULDER: ICD-10-CM

## 2023-01-01 DIAGNOSIS — E78.5 HYPERLIPIDEMIA, UNSPECIFIED: ICD-10-CM

## 2023-01-01 DIAGNOSIS — M79.18 MYALGIA, OTHER SITE: ICD-10-CM

## 2023-01-01 DIAGNOSIS — R93.89 ABNORMAL FINDINGS ON DIAGNOSTIC IMAGING OF OTHER SPECIFIED BODY STRUCTURES: ICD-10-CM

## 2023-01-01 DIAGNOSIS — Z00.00 ENCOUNTER FOR GENERAL ADULT MEDICAL EXAMINATION W/OUT ABNORMAL FINDINGS: ICD-10-CM

## 2023-01-01 DIAGNOSIS — Z96.642 PRESENCE OF LEFT ARTIFICIAL HIP JOINT: ICD-10-CM

## 2023-01-01 DIAGNOSIS — F41.8 OTHER SPECIFIED ANXIETY DISORDERS: ICD-10-CM

## 2023-01-01 DIAGNOSIS — S22.41XA MULTIPLE FRACTURES OF RIBS, RIGHT SIDE, INITIAL ENCOUNTER FOR CLOSED FRACTURE: ICD-10-CM

## 2023-01-01 PROCEDURE — 20611 DRAIN/INJ JOINT/BURSA W/US: CPT | Mod: RT

## 2023-01-01 PROCEDURE — G2212 PROLONG OUTPT/OFFICE VIS: CPT

## 2023-01-01 PROCEDURE — 99214 OFFICE O/P EST MOD 30 MIN: CPT

## 2023-01-01 PROCEDURE — 99344 HOME/RES VST NEW MOD MDM 60: CPT

## 2023-01-01 PROCEDURE — 90662 IIV NO PRSV INCREASED AG IM: CPT

## 2023-01-01 PROCEDURE — G0008: CPT

## 2023-01-01 PROCEDURE — G0009: CPT

## 2023-01-01 PROCEDURE — G0439: CPT

## 2023-01-01 PROCEDURE — 99495 TRANSJ CARE MGMT MOD F2F 14D: CPT

## 2023-01-01 PROCEDURE — 99214 OFFICE O/P EST MOD 30 MIN: CPT | Mod: 25

## 2023-01-01 PROCEDURE — 99349 HOME/RES VST EST MOD MDM 40: CPT

## 2023-01-01 PROCEDURE — 90677 PCV20 VACCINE IM: CPT

## 2023-01-01 PROCEDURE — 99215 OFFICE O/P EST HI 40 MIN: CPT | Mod: 25

## 2023-01-01 RX ORDER — CLOTRIMAZOLE 10 MG/G
1 CREAM TOPICAL TWICE DAILY
Qty: 1 | Refills: 5 | Status: ACTIVE | COMMUNITY
Start: 2023-01-01 | End: 1900-01-01

## 2023-01-01 RX ORDER — APIXABAN 5 MG/1
5 TABLET, FILM COATED ORAL
Qty: 60 | Refills: 0 | Status: ACTIVE | COMMUNITY
Start: 2023-01-01

## 2023-01-01 RX ORDER — FOLIC ACID 1 MG/1
1 TABLET ORAL
Qty: 30 | Refills: 11 | Status: ACTIVE | COMMUNITY
Start: 2023-01-01 | End: 1900-01-01

## 2023-01-01 RX ORDER — GABAPENTIN 600 MG/1
600 TABLET, COATED ORAL 3 TIMES DAILY
Qty: 90 | Refills: 3 | Status: ACTIVE | COMMUNITY
Start: 1900-01-01 | End: 1900-01-01

## 2023-01-01 RX ORDER — ROSUVASTATIN CALCIUM 40 MG/1
40 TABLET, FILM COATED ORAL DAILY
Qty: 90 | Refills: 3 | Status: ACTIVE | COMMUNITY
Start: 2022-01-25 | End: 1900-01-01

## 2023-01-01 RX ORDER — TOPIRAMATE 25 MG/1
25 CAPSULE, COATED PELLETS ORAL TWICE DAILY
Qty: 180 | Refills: 3 | Status: DISCONTINUED | COMMUNITY
Start: 2021-12-07 | End: 2023-01-01

## 2023-01-01 RX ORDER — METHOCARBAMOL 500 MG/1
500 TABLET, FILM COATED ORAL
Qty: 45 | Refills: 1 | Status: ACTIVE | COMMUNITY
Start: 2021-08-20 | End: 1900-01-01

## 2023-01-01 RX ORDER — METOPROLOL TARTRATE 50 MG/1
50 TABLET, FILM COATED ORAL
Qty: 180 | Refills: 3 | Status: ACTIVE | COMMUNITY
Start: 2022-06-15 | End: 1900-01-01

## 2023-01-01 RX ORDER — TORSEMIDE 20 MG/1
20 TABLET ORAL
Qty: 180 | Refills: 2 | Status: DISCONTINUED | COMMUNITY
Start: 2022-11-23 | End: 2023-01-01

## 2023-01-01 RX ORDER — ZOSTER VACCINE RECOMBINANT, ADJUVANTED 50 MCG/0.5
50 KIT INTRAMUSCULAR
Qty: 2 | Refills: 0 | Status: ACTIVE | COMMUNITY
Start: 2023-01-01 | End: 1900-01-01

## 2023-01-01 RX ORDER — GABAPENTIN 600 MG/1
600 TABLET, COATED ORAL 3 TIMES DAILY
Qty: 540 | Refills: 3 | Status: DISCONTINUED | COMMUNITY
Start: 2022-06-02 | End: 2023-01-01

## 2023-01-01 RX ORDER — LEVOTHYROXINE SODIUM 0.07 MG/1
75 TABLET ORAL DAILY
Qty: 90 | Refills: 3 | Status: ACTIVE | COMMUNITY
Start: 2022-02-17 | End: 1900-01-01

## 2023-01-01 RX ORDER — TIZANIDINE 2 MG/1
2 TABLET ORAL
Qty: 45 | Refills: 0 | Status: DISCONTINUED | COMMUNITY
Start: 2020-08-11 | End: 2023-01-01

## 2023-01-01 RX ORDER — TRAZODONE HYDROCHLORIDE 300 MG/1
300 TABLET ORAL
Refills: 0 | Status: ACTIVE | COMMUNITY
Start: 2023-01-01

## 2023-01-01 RX ORDER — APIXABAN 5 MG/1
5 TABLET, FILM COATED ORAL
Qty: 180 | Refills: 3 | Status: DISCONTINUED | COMMUNITY
Start: 2022-06-15 | End: 2023-01-01

## 2023-01-01 RX ORDER — DABIGATRAN ETEXILATE 150 MG/1
150 CAPSULE ORAL
Qty: 60 | Refills: 5 | Status: DISCONTINUED | COMMUNITY
Start: 2023-01-01 | End: 2023-01-01

## 2023-01-01 RX ORDER — TORSEMIDE 20 MG/1
20 TABLET ORAL DAILY
Qty: 90 | Refills: 3 | Status: ACTIVE | COMMUNITY
Start: 2023-01-01

## 2023-01-01 RX ORDER — LISINOPRIL 10 MG/1
10 TABLET ORAL
Refills: 0 | Status: DISCONTINUED | COMMUNITY
End: 2023-01-01

## 2023-01-01 RX ORDER — TRAZODONE HYDROCHLORIDE 100 MG/1
100 TABLET ORAL AT BEDTIME
Qty: 120 | Refills: 1 | Status: ACTIVE | COMMUNITY

## 2023-01-01 RX ORDER — ARIPIPRAZOLE 2 MG/1
2 TABLET ORAL DAILY
Refills: 0 | Status: DISCONTINUED | COMMUNITY
Start: 2023-01-01 | End: 2023-01-01

## 2023-01-01 RX ORDER — PRIMIDONE 50 MG/1
50 TABLET ORAL
Qty: 360 | Refills: 3 | Status: ACTIVE | COMMUNITY
Start: 2023-01-01 | End: 1900-01-01

## 2023-01-01 RX ORDER — FLUTICASONE FUROATE, UMECLIDINIUM BROMIDE AND VILANTEROL TRIFENATATE 200; 62.5; 25 UG/1; UG/1; UG/1
200-62.5-25 POWDER RESPIRATORY (INHALATION)
Qty: 3 | Refills: 3 | Status: ACTIVE | COMMUNITY
Start: 2022-09-29

## 2023-01-01 RX ORDER — UBIDECARENONE/VIT E ACET 100MG-5
25 MCG CAPSULE ORAL
Qty: 90 | Refills: 3 | Status: ACTIVE | COMMUNITY
Start: 2023-01-01 | End: 1900-01-01

## 2023-01-01 RX ADMIN — BUPIVACAINE HYDROCHLORIDE %: 2.5 INJECTION, SOLUTION INFILTRATION; PERINEURAL at 00:00

## 2023-01-01 RX ADMIN — TRIAMCINOLONE ACETONIDE MG/ML: 10 INJECTION, SUSPENSION INTRA-ARTICULAR; INTRALESIONAL at 00:00

## 2023-01-01 RX ADMIN — BUPIVACAINE HYDROCHLORIDE 0 %: 7.5 INJECTION, SOLUTION EPIDURAL; RETROBULBAR at 00:00

## 2023-01-01 RX ADMIN — Medication %: at 00:00

## 2023-01-04 NOTE — REVIEW OF SYSTEMS
[Fever] : no fever [Night Sweats] : no night sweats [Vision Problems] : vision problems [Chest Pain] : no chest pain [Palpitations] : no palpitations [Orthopnea] : no orthopnea [Paroxysmal Nocturnal Dyspnea] : no paroxysmal nocturnal dyspnea [Cough] : no cough [Dyspnea on Exertion] : dyspnea on exertion [Easy Bleeding] : no easy bleeding [Easy Bruising] : easy bruising [Negative] : Neurological [FreeTextEntry3] : decreased [de-identified] : tremor

## 2023-01-04 NOTE — ASSESSMENT
[Patient Optimized for Surgery] : Patient optimized for surgery [No Further Testing Recommended] : no further testing recommended [As per surgery] : as per surgery [FreeTextEntry4] : Ms. MARTINEZ  is a 77 year-old female  with no significant history of coronary artery disease.  She  denies chest pain, palpitations or shortness of breath and  her EKG  in Nov was acceptable.  She  does take blood thinners and will continue  her  medications perioperatively as instructed.\par \par Ms. MARTINEZ  has a low-moderate risk for perioperative cardiovascular complications and will undergo the procedure as planned. \par

## 2023-01-04 NOTE — PHYSICAL EXAM
[Obese] : patient was observed to be obese [Normal] : normal gait, coordination grossly intact, no focal deficits [de-identified] : cataract [de-identified] : ADELAIDE , RR [de-identified] : obese

## 2023-01-04 NOTE — HISTORY OF PRESENT ILLNESS
[Atrial Fibrillation] : atrial fibrillation [COPD] : COPD [Smoker] : smoker [No Adverse Anesthesia Reaction] : no adverse anesthesia reaction in self or family member [Chronic Anticoagulation] : chronic anticoagulation [Chronic Kidney Disease] : no chronic kidney disease [Diabetes] : no diabetes [Poor (<4 METs)] : Poor (<4 METs) [FreeTextEntry1] : R cataract surgery [FreeTextEntry2] : 1/9 [FreeTextEntry3] : dr Pickering [FreeTextEntry4] : Page comes in today for preoperative evaluation prior to cataract surgery with Dr. Pickering on January 9.  She is a patient of Dr. Hutchinson and has multiple medical problems.  She is on constant oxygen via nasal cannula, has a history of chronic diastolic heart failure, paroxysmal atrial fibrillation on Eliquis. [FreeTextEntry6] : QUEZADA  on O2  chronic [FreeTextEntry7] : ECHO and cardiac  note 11/2022 [FreeTextEntry8] : on WC

## 2023-01-04 NOTE — RESULTS/DATA
[] : results reviewed [de-identified] : 11/2022 [de-identified] : labs not needed, Nov 2022 CBC CMET acceptable

## 2023-01-09 ENCOUNTER — HOSPITAL ENCOUNTER (OUTPATIENT)
Dept: HOSPITAL 74 - FASU | Age: 78
Discharge: HOME | End: 2023-01-09
Attending: STUDENT IN AN ORGANIZED HEALTH CARE EDUCATION/TRAINING PROGRAM
Payer: COMMERCIAL

## 2023-01-09 VITALS — RESPIRATION RATE: 16 BRPM | TEMPERATURE: 97.8 F | HEART RATE: 63 BPM

## 2023-01-09 VITALS — BODY MASS INDEX: 37.8 KG/M2

## 2023-01-09 VITALS — SYSTOLIC BLOOD PRESSURE: 132 MMHG | DIASTOLIC BLOOD PRESSURE: 67 MMHG

## 2023-01-09 DIAGNOSIS — H25.11: Primary | ICD-10-CM

## 2023-01-09 PROCEDURE — V2632 POST CHMBR INTRAOCULAR LENS: HCPCS

## 2023-01-09 PROCEDURE — 66984 XCAPSL CTRC RMVL W/O ECP: CPT

## 2023-01-09 PROCEDURE — 08RJ3JZ REPLACEMENT OF RIGHT LENS WITH SYNTHETIC SUBSTITUTE, PERCUTANEOUS APPROACH: ICD-10-PCS | Performed by: STUDENT IN AN ORGANIZED HEALTH CARE EDUCATION/TRAINING PROGRAM

## 2023-01-09 RX ADMIN — TROPICAMIDE SCH DROP: 10 SOLUTION/ DROPS OPHTHALMIC at 07:20

## 2023-01-09 RX ADMIN — CYCLOPENTOLATE HYDROCHLORIDE SCH DROP: 10 SOLUTION/ DROPS OPHTHALMIC at 07:30

## 2023-01-09 RX ADMIN — CYCLOPENTOLATE HYDROCHLORIDE SCH DROP: 10 SOLUTION/ DROPS OPHTHALMIC at 07:20

## 2023-01-09 RX ADMIN — OFLOXACIN SCH DROP: 3 SOLUTION/ DROPS OPHTHALMIC at 07:25

## 2023-01-09 RX ADMIN — TROPICAMIDE SCH DROP: 10 SOLUTION/ DROPS OPHTHALMIC at 07:30

## 2023-01-09 RX ADMIN — PHENYLEPHRINE HYDROCHLORIDE SCH DROP: 0.25 SPRAY NASAL at 07:20

## 2023-01-09 RX ADMIN — OFLOXACIN SCH DROP: 3 SOLUTION/ DROPS OPHTHALMIC at 07:20

## 2023-01-09 RX ADMIN — OFLOXACIN SCH DROP: 3 SOLUTION/ DROPS OPHTHALMIC at 07:30

## 2023-01-09 RX ADMIN — KETOROLAC TROMETHAMINE SCH DROP: 5 SOLUTION OPHTHALMIC at 07:25

## 2023-01-09 RX ADMIN — KETOROLAC TROMETHAMINE SCH DROP: 5 SOLUTION OPHTHALMIC at 07:20

## 2023-01-09 RX ADMIN — PHENYLEPHRINE HYDROCHLORIDE SCH DROP: 0.25 SPRAY NASAL at 07:25

## 2023-01-09 RX ADMIN — TROPICAMIDE SCH DROP: 10 SOLUTION/ DROPS OPHTHALMIC at 07:25

## 2023-01-09 RX ADMIN — CYCLOPENTOLATE HYDROCHLORIDE SCH DROP: 10 SOLUTION/ DROPS OPHTHALMIC at 07:25

## 2023-01-09 RX ADMIN — PHENYLEPHRINE HYDROCHLORIDE SCH DROP: 0.25 SPRAY NASAL at 07:30

## 2023-01-09 RX ADMIN — KETOROLAC TROMETHAMINE SCH DROP: 5 SOLUTION OPHTHALMIC at 07:30

## 2023-02-08 PROBLEM — Z01.818 PREOP EXAMINATION: Status: RESOLVED | Noted: 2023-01-01 | Resolved: 2023-01-01

## 2023-02-08 PROBLEM — Z96.642 STATUS POST LEFT HIP REPLACEMENT: Status: RESOLVED | Noted: 2021-07-07 | Resolved: 2023-01-01

## 2023-02-08 PROBLEM — Z01.818 PREOPERATIVE CLEARANCE: Status: RESOLVED | Noted: 2022-02-01 | Resolved: 2023-01-01

## 2023-02-08 NOTE — HISTORY OF PRESENT ILLNESS
[Post-hospitalization from ___ Hospital] : Post-hospitalization from [unfilled] Hospital [Admitted on: ___] : The patient was admitted on [unfilled] [Discharged on ___] : discharged on [unfilled] [Discharge Summary] : discharge summary [Pertinent Labs] : pertinent labs [Radiology Findings] : radiology findings [Discharge Med List] : discharge medication list [FreeTextEntry2] : Page comes in for hospital discharge follow-up and to establish care.  Previous patient of Dr. Hutchinson.\par She was admitted on January 10 by emergency room/EMS after complaining of palpitations.  She has a history of paroxysmal atrial fibrillation and was already on Eliquis.  In the hospital she was found to be hypercapnic.  As per patient she does not use her CPAP at home and she is supposed to.  She is oxygen dependent as well.  She recently stopped smoking.  After few days in the hospital and evaluation by cardiology she was sent to rehab for 10 days.  Today she feels fine.  As per patient she was drinking power  drinks, 3 a day to get more energy.

## 2023-02-08 NOTE — PHYSICAL EXAM
[Obese] : patient was observed to be obese [Normal] : normal gait, coordination grossly intact, no focal deficits [de-identified] : ADELAIDE , RR

## 2023-02-08 NOTE — REVIEW OF SYSTEMS
[Fever] : no fever [Night Sweats] : no night sweats [Vision Problems] : no vision problems [Chest Pain] : no chest pain [Palpitations] : no palpitations [Orthopnea] : no orthopnea [Paroxysmal Nocturnal Dyspnea] : no paroxysmal nocturnal dyspnea [Cough] : no cough [Dyspnea on Exertion] : dyspnea on exertion [Easy Bleeding] : no easy bleeding [Easy Bruising] : easy bruising [Negative] : Neurological [FreeTextEntry3] : better after cataract sx, second eye postponed [de-identified] : tremor

## 2023-03-03 PROBLEM — B37.2 INTERTRIGINOUS CANDIDIASIS: Status: ACTIVE | Noted: 2023-01-01

## 2023-03-03 PROBLEM — Z00.00 ENCOUNTER FOR PREVENTIVE HEALTH EXAMINATION: Status: ACTIVE | Noted: 2019-12-05

## 2023-03-03 PROBLEM — R93.89 ABNORMAL CT SCAN: Status: ACTIVE | Noted: 2021-09-23

## 2023-03-03 PROBLEM — Z09 HOSPITAL DISCHARGE FOLLOW-UP: Status: RESOLVED | Noted: 2023-01-01 | Resolved: 2023-01-01

## 2023-03-03 PROBLEM — Z76.89 ENCOUNTER TO ESTABLISH CARE WITH NEW DOCTOR: Status: RESOLVED | Noted: 2021-07-07 | Resolved: 2023-01-01

## 2023-03-03 PROBLEM — Z87.898 HISTORY OF SUBSTANCE USE DISORDER: Status: RESOLVED | Noted: 2020-03-24 | Resolved: 2023-01-01

## 2023-03-03 PROBLEM — Z23 NEED FOR PNEUMOCOCCAL VACCINATION: Status: ACTIVE | Noted: 2023-01-01

## 2023-03-03 PROBLEM — S22.41XA CLOSED FRACTURE OF MULTIPLE RIBS OF RIGHT SIDE, INITIAL ENCOUNTER: Status: RESOLVED | Noted: 2021-07-07 | Resolved: 2023-01-01

## 2023-03-03 PROBLEM — H26.9 CATARACT, RIGHT: Status: RESOLVED | Noted: 2023-01-01 | Resolved: 2023-01-01

## 2023-03-03 NOTE — REVIEW OF SYSTEMS
[Dyspnea on Exertion] : dyspnea on exertion [Easy Bruising] : easy bruising [Fatigue] : fatigue [Joint Pain] : joint pain [Back Pain] : back pain [Skin Rash] : skin rash [Negative] : Integumentary [Fever] : no fever [Night Sweats] : no night sweats [Pain] : no pain [Vision Problems] : no vision problems [Chest Pain] : no chest pain [Palpitations] : no palpitations [Orthopnea] : no orthopnea [Paroxysmal Nocturnal Dyspnea] : no paroxysmal nocturnal dyspnea [Cough] : no cough [Easy Bleeding] : no easy bleeding [FreeTextEntry6] : O2 dependent [FreeTextEntry9] : shoulders  see HPI [de-identified] : below breast [de-identified] : tremor

## 2023-03-03 NOTE — PHYSICAL EXAM
[Obese] : patient was observed to be obese [Normal] : no joint swelling, grossly normal strength/tone [de-identified] : ADELAIDE 2/6  , RR today [de-identified] : no gross lumps bilat ,  [de-identified] : R breast intertrigo

## 2023-03-03 NOTE — HEALTH RISK ASSESSMENT
[Fair] :  ~his/her~ mood as fair [No] : No [No falls in past year] : Patient reported no falls in the past year [Patient declined mammogram] : Patient declined mammogram [Patient reported colonoscopy was normal] : Patient reported colonoscopy was normal [HIV test declined] : HIV test declined [Hepatitis C test offered] : Hepatitis C test offered [With Family] : lives with family [Retired] : retired [College] : College [] :  [# Of Children ___] : has [unfilled] children [Fully functional (bathing, dressing, toileting, transferring, walking, feeding)] : Fully functional (bathing, dressing, toileting, transferring, walking, feeding) [Patient/Caregiver not ready to engage] : , patient/caregiver not ready to engage [Former] : Former [10-14] : 10-14 [< 15 Years] : < 15 Years [ColonoscopyDate] : 01/17 [de-identified] : needs help

## 2023-03-03 NOTE — HISTORY OF PRESENT ILLNESS
[de-identified] : here for annual exam, chronic pain , now in shoulders, seen by pain management and had multiple treatment, h/o alcoholosma nd drug addiction

## 2023-03-22 NOTE — HISTORY OF PRESENT ILLNESS
[7] : 3. What number best describes how, during the past week, pain has interfered with your general activity? 7/10 pain [Back Pain] : back pain [Neck Pain] : neck pain [Joint Pain] : joint  [10] : a maximum pain level of 10/10 [Sharp] : sharp [Aching] : aching [Standing] : standing [Walking] : walking [Bending] : bending [Sitting] : sitting [FreeTextEntry1] : Interval Note:\par \par sp RIGHT glenohumeral joint steroid injection with improvement in shoulder pain previously patient reports that the right shoulder pain has returned and is affecting adls.  In additional patient is interested in pursuing medicinal cannabis.  She is seeing a new psychiatrist Dr. Beatty, who she will be seeing later this week.    Denies any worsening numbness, weakness, bowel/bladder dysfunction.\par \par   \par \par HPI\par \par Ms. SARAHI MARTINEZ is a 77 year F with pmhx of COPD, htn on asa 81mg  PPHx of mood disorder followed by Dr. Foster, opioid use disorder previously suboxone SL 8-2mg and SL 2-0.5mg but has since discontinued, PMHx of spinal stenosis, HTN, s/p left femoral prosthesis, [employer] Scs device placed by Dr. Muniz.  Presents today with many years of bilateral lower back pain.  Patient states that the pain is worse with transition, axial, and prevents her from being able to tolerate ALDs.  It is even worse now that she is weaned from suboxone.  denies any worsening numbness, weakness, bowel/bladder dysfunction\par \par \par Previous and current pain medications/doses/effects:\par \par Previous Pain Treatments:\par \par Previous Pain Injections:\par RIGHT knee joint steroid injection 4/1/22\par RIGHT glenohumeral joint steroid injection 3/18/22\par right knee  steroid injection 1/21/22\par  right subacromial bursa steroid injection 7/16/21\par  RIGHT Knee genicular nerve ABLATION 5/13/21\par right knee ZILRETTA  steroid injection 11/12/20\par LEFT then RIGHT  L3-sacral ala medial branch radiofrequency ablation 8/2020\par  right knee Viscosupplementation injection completed 9/16/2020\par  right knee steroid injection 7/17/2020\par BILATERAL L3-sacral ala diagnostic medial branch block 7/16/2020\par  caudal epidural steroid injection 6/11/2020\par \par Previous Diagnostic Studies/Images:\par  \par XR R Shoulder 3/22\par \par Moderate degenerative spurring of the humeral head. If clinically indicated, further\par assessment of the shoulder with MRI is recommended.\par \par XR R knee 7/2020\par \par Severe degenerative arthritis of the right knee\par \par CT LS 5/2020\par \par There is sacralization of the L5 vertebral body.  The lumbar lordosis is preserved.  The\par vertebral body heights joint are maintained. There is mild disc space narrowing at at L1-L2.  A\par spinal stimulator is noted with the leads entering the thecal sac at the  T11-12 level.  The pre-\par vertebral soft tissues are unremarkable.\par \par  At the L1/2 level, there is a disc bulge without significant spinal canal stenosis or\par neuroforaminal narrowing..\par \par  At the L2/3 level, there is a disc bulge with facet and ligamentum flavum hypertrophy resulting in\par up to moderate spinal canal stenosis with mild to moderate bilateral neural foraminal narrowing..\par \par  At the L3/4 level, there is a disc bulge with facet and ligamentum flavum hypertrophy resulting in\par mild spinal canal stenosis with moderate bilateral neural foraminal narrowing.\par \par  At the L4/5 level, there is a disc bulge with facet and ligamentum flavum hypertrophy resulting in\par mild spinal canal stenosis with moderate to severe bilateral neural foraminal narrowing and possible\par impingement on the exiting nerve roots.\par \par  At the L5/S1 level, there is no disc herniation. There is no spinal canal stenosis or neural\par foramen narrowing.\par \par \par  IMPRESSION:\par \par  L4-5 degenerative changes resulting in mild spinal canal stenosis with moderate to severe bilateral\par neural foraminal narrowing and possible impingement on the exiting nerve root.\par \par  L3-4 degenerative changes resulting in moderate bilateral neural foraminal narrowing.\par  [FreeTextEntry2] : 21

## 2023-03-22 NOTE — ASSESSMENT
[FreeTextEntry1] : >> Imaging and Other Studies\par \par I personally reviewed the relevant imaging.  Discussed and explained to patient the likely source of pathology and pain.  Questions answered.\par \par I personally reviewed the relevant imaging.  Discussed and explained to patient the likely source of pathology and pain.  Questions answered. XR\par \par >> Therapy and Other Modalities\par \par continue home exercises\par \par >> Medications\par  \par continue robaxin prn spasm\par \par I advised SARAHI that the NSAID should be taken with food.  In addition while taking the prescribed NSAID, no over the counter or other NSAIDs should be used, such as ibuprofen (Motrin or Advil) or naproxen (Aleve) as this can cause stomach upset or other side effects.  If needed for fever or breakthrough pain Tylenol can be used.\par \par >> Interventions\par \par scs device programmed and scanned into chart\par \par subacromial bursitis pain refractory to conservative treatments. sp right subacromial bursa steroid injection r/b/a discussed\par \par  shoulder pain likely secondary to significant joint arthropathy demonstrated on imaging refractory to conservative treatments. sp  RIGHT glenohumeral joint steroid injection r/b/a discussed, consented\par \par given efficacy of previous intervention that is >80% improvement in pain and improved ability to perform adls, and return of pain despite conservative treatment, will schedule repeat  RIGHT glenohumeral joint steroid injection \par \par back and leg pain likely secondary to lumbar radiculopathy and discogenic pain refractory to conservative treatments in patient with hx of substance use disorder\par \par Right knee pain secondary to arthritis refractory to conservative treatments and steroid injection.  Significantly debilitating to patient. s/p right knee Viscosupplementation injection\par \par given efficacy of previous intervention that is >80% improvement in pain and improved ability to perform adls, and return of pain despite conservative treatment, sp right knee Viscosupplementation injection\par  \par \par \par severe knee pain secondary to osteoarthritis refractory to multiple conservative treatments as well as interventions.  Patient poor candidate for knee surgery and at this point, she is unwilling to consider surgical intervention.  s/p  RIGHT knee diagnostic genicular nerve block with 8 hours of 100% improvement\par \par Patient experienced 80% relief of knee pain for 2 days after genicular nerve block with improvement in ambulation and ability to perform adls.  Given significant relief, sp RIGHT Knee genicular nerve ABLATION\par \par Start PT for lumbar spondylosis and myofascial pain\par \par continue tizanidine prn spasm\par \par persistent pain secondary to lumbosacral stenosis demonstrated on imaging refractory to conservative treatments, s/p caudal epidural steroid injection with improvement in radicular pain\par \par SCS device interrogated and reprogrammed today - scanned into chart\par \par Significant component of axial back pain secondary to lumbar spondylosis and facet arthropathy demonstrated on MRI LS.  Pain refractory to conservative treatments.  s/p BILATERAL L3-sacral ala diagnostic medial branch block \par \par Given significant relief from diagnostic lumbar medial branch block of >80%, and continued lumbar facet arthropathy pain and axial back pain,s/p LEFT then RIGHT  L3-sacral ala medial branch radiofrequency ablation \par \par given efficacy of previous intervention that is >30% improvement in pain and improved ability to perform adls, and return of pain despite conservative treatment, may consider repeat LEFT then RIGHT  L4-sacral ala medial branch radiofrequency ablation (2 joints,  3 nerves)\par \par >> Consults\par \par patient will consult with psychiatry regarding appropriateness of medicinal cannabis\par >> Discussion of Risks/Benefits/Alternatives\par \par 	>Regarding any scheduled procedures:\par \par I have discussed in detail with the patient that any interventional pain procedure is associated with potential risks.  The procedure may include an injection of steroids and potentially other medications (local anesthetic and normal saline) into the epidural space or surrounding tissue of the spine.  There are significant risks of this procedure which include and are not limited to infection, bleeding, worsening pain, dural puncture leading to postdural puncture headache, nerve damage, spinal cord injury, paralysis, stroke, and death.  \par \par There is a chance that the procedure does not improve their pain.  \par \par There are risks associated with the steroid being absorbed into the body systemically.  These include dysphoria, difficulty sleeping, mood swings and personality changes.  Premenopausal women may notice an irregularity in her menstrual cycle for 2-3 months following the injection.  Steroids can specifically affect patients with hypertension, diabetes, and peptic ulcers.  The procedure may cause a temporary increase in blood pressure and blood pressure, and may adversely affect a peptic ulcer.  Other, more rare complications, include avascular necrosis of joints, glaucoma and worsening of osteoporosis. \par \par I have discussed the risks of the procedure at length with the patient, and the potential benefits of pain relief.  I have offered alternatives to the procedure.  All questions were answered.  \par \par The patient expressed understanding and wishes to proceed with the procedure.\par \par 	>Regarding COVID19 Pandemic: \par \par Any planned interventional pain procedure are scheduled because further delay may cause harm or negative outcome to patient.  The goal in performing this procedure is to avoid deterioration of function, emergency room visits (which increases exposure) and reliance on opioids.  \par \par r/b/a discussed with patient, lack of evidence to conclusively determine whether pain management procedures have any positive or negative impact on the possibility of alex the virus and/or development of any sequelae. \par \par Patient counselled regarding timing steroid based intervention 2 weeks before or after COVID-19 vaccine administration to avoid any interaction or affect on efficacy of vaccination\par \par Patient demonstrates understanding\par \par Informed patient that risks associated with the COVID-19 infection.  Informed patient steps taken to limit the risks.  We are implementing safety precautions and following protocols consistent with the CDC and state recommendations. All patients and staff will be checked for fever or signs of illness upon entry to the facility. We will limit our steroid dose to the lowest effective therapeutic dose or in some cases steroids will not be injected at all. \par \par Patient agrees to proceed\par \par >> Conclusion\par \par The above diagnosis and treatment plan is medically reasonable and necessary based on the patient encounter \par There were no barriers to communication.\par Informed patient that I would be available for any additional questions.\par Patient was instructed to call with any worsening symptoms including severe pain, new numbness/weakness, or changes in the bowel/bladder function. \par Discussed role of nsaids in pain management and all relevant risks, if patient is continuing to require after 4 weeks the patient should f/u for alternative treatment. \par Instructed patient to maintain pain diary to monitor pain level, mobility, and function.\par \par

## 2023-03-22 NOTE — PHYSICAL EXAM
[Spurling] : negative Spurling's Test [Almaguer] : positive Almaguer Test [Neer's] : positive Neer's Test [Normal] : Normal affect

## 2023-03-31 NOTE — ASSESSMENT
[FreeTextEntry1] : >> Imaging and Other Studies\par \par I personally reviewed the relevant imaging.  Discussed and explained to patient the likely source of pathology and pain.  Questions answered.\par \par I personally reviewed the relevant imaging.  Discussed and explained to patient the likely source of pathology and pain.  Questions answered. XR\par \par >> Therapy and Other Modalities\par \par continue home exercises\par \par >> Medications\par \par Patient evaluated for medical cannabis. Reviewed medical history and current medications. At this time just taking Wellbutrin for depression. Patient will increase frequency of visits with her psychiatrist to twice monthly or more frequently as per her psychiatrist. Monitor for worsening depression. Treatment team feels that patient meets criteria for medical cannabis certification including Chronic Pain that degrades functional capabilities. Reviewed potential for adverse events and stressed not to drive, make important life-changing decisions, operate heavy machinery during treatment or mix with sedation medications, emphasized not to mix with her Klonopin. Explained patient will need to report adverse events to provider. Encouraged patient to ask additional questions. Discussed certification process. On certification patient excluded from inhaled formulations due to COPD.  All questions answered. \par \par \par \par Certification process performed on [facility]\par \par Patient directed to [facility] Website to find dispensary\par \par I-stop reference number #: [id number]\par \par Agreement for use of Controlled substance medication(s) completed\par \par continue robaxin prn spasm\par \par I advised SARAHI that the NSAID should be taken with food.  In addition while taking the prescribed NSAID, no over the counter or other NSAIDs should be used, such as ibuprofen (Motrin or Advil) or naproxen (Aleve) as this can cause stomach upset or other side effects.  If needed for fever or breakthrough pain Tylenol can be used.\par \par >> Interventions\par \par scs device programmed and scanned into chart\par \par subacromial bursitis pain refractory to conservative treatments. sp right subacromial bursa steroid injection r/b/a discussed\par \par  shoulder pain likely secondary to significant joint arthropathy demonstrated on imaging refractory to conservative treatments. sp  RIGHT glenohumeral joint steroid injection r/b/a discussed, consented\par \par given efficacy of previous intervention that is >80% improvement in pain and improved ability to perform adls, and return of pain despite conservative treatment, will schedule repeat  RIGHT glenohumeral joint steroid injection \par \par back and leg pain likely secondary to lumbar radiculopathy and discogenic pain refractory to conservative treatments in patient with hx of substance use disorder\par \par Right knee pain secondary to arthritis refractory to conservative treatments and steroid injection.  Significantly debilitating to patient. s/p right knee Viscosupplementation injection\par \par given efficacy of previous intervention that is >80% improvement in pain and improved ability to perform adls, and return of pain despite conservative treatment, sp right knee Viscosupplementation injection\par  \par \par \par severe knee pain secondary to osteoarthritis refractory to multiple conservative treatments as well as interventions.  Patient poor candidate for knee surgery and at this point, she is unwilling to consider surgical intervention.  s/p  RIGHT knee diagnostic genicular nerve block with 8 hours of 100% improvement\par \par Patient experienced 80% relief of knee pain for 2 days after genicular nerve block with improvement in ambulation and ability to perform adls.  Given significant relief, sp RIGHT Knee genicular nerve ABLATION\par \par Start PT for lumbar spondylosis and myofascial pain\par \par continue tizanidine prn spasm\par \par persistent pain secondary to lumbosacral stenosis demonstrated on imaging refractory to conservative treatments, s/p caudal epidural steroid injection with improvement in radicular pain\par \par SCS device interrogated and reprogrammed today - scanned into chart\par \par Significant component of axial back pain secondary to lumbar spondylosis and facet arthropathy demonstrated on MRI LS.  Pain refractory to conservative treatments.  s/p BILATERAL L3-sacral ala diagnostic medial branch block \par \par Given significant relief from diagnostic lumbar medial branch block of >80%, and continued lumbar facet arthropathy pain and axial back pain,s/p LEFT then RIGHT  L3-sacral ala medial branch radiofrequency ablation \par \par given efficacy of previous intervention that is >30% improvement in pain and improved ability to perform adls, and return of pain despite conservative treatment, may consider repeat LEFT then RIGHT  L4-sacral ala medial branch radiofrequency ablation (2 joints,  3 nerves)\par \par >> Consults\par \par patient will consult with psychiatry regarding appropriateness of medicinal cannabis\par >> Discussion of Risks/Benefits/Alternatives\par \par 	>Regarding any scheduled procedures:\par \par I have discussed in detail with the patient that any interventional pain procedure is associated with potential risks.  The procedure may include an injection of steroids and potentially other medications (local anesthetic and normal saline) into the epidural space or surrounding tissue of the spine.  There are significant risks of this procedure which include and are not limited to infection, bleeding, worsening pain, dural puncture leading to postdural puncture headache, nerve damage, spinal cord injury, paralysis, stroke, and death.  \par \par There is a chance that the procedure does not improve their pain.  \par \par There are risks associated with the steroid being absorbed into the body systemically.  These include dysphoria, difficulty sleeping, mood swings and personality changes.  Premenopausal women may notice an irregularity in her menstrual cycle for 2-3 months following the injection.  Steroids can specifically affect patients with hypertension, diabetes, and peptic ulcers.  The procedure may cause a temporary increase in blood pressure and blood pressure, and may adversely affect a peptic ulcer.  Other, more rare complications, include avascular necrosis of joints, glaucoma and worsening of osteoporosis. \par \par I have discussed the risks of the procedure at length with the patient, and the potential benefits of pain relief.  I have offered alternatives to the procedure.  All questions were answered.  \par \par The patient expressed understanding and wishes to proceed with the procedure.\par \par 	>Regarding COVID19 Pandemic: \par \par Any planned interventional pain procedure are scheduled because further delay may cause harm or negative outcome to patient.  The goal in performing this procedure is to avoid deterioration of function, emergency room visits (which increases exposure) and reliance on opioids.  \par \par r/b/a discussed with patient, lack of evidence to conclusively determine whether pain management procedures have any positive or negative impact on the possibility of alex the virus and/or development of any sequelae. \par \par Patient counselled regarding timing steroid based intervention 2 weeks before or after COVID-19 vaccine administration to avoid any interaction or affect on efficacy of vaccination\par \par Patient demonstrates understanding\par \par Informed patient that risks associated with the COVID-19 infection.  Informed patient steps taken to limit the risks.  We are implementing safety precautions and following protocols consistent with the CDC and state recommendations. All patients and staff will be checked for fever or signs of illness upon entry to the facility. We will limit our steroid dose to the lowest effective therapeutic dose or in some cases steroids will not be injected at all. \par \par Patient agrees to proceed\par \par >> Conclusion\par \par The above diagnosis and treatment plan is medically reasonable and necessary based on the patient encounter \par There were no barriers to communication.\par Informed patient that I would be available for any additional questions.\par Patient was instructed to call with any worsening symptoms including severe pain, new numbness/weakness, or changes in the bowel/bladder function. \par Discussed role of nsaids in pain management and all relevant risks, if patient is continuing to require after 4 weeks the patient should f/u for alternative treatment. \par Instructed patient to maintain pain diary to monitor pain level, mobility, and function.\par \par

## 2023-03-31 NOTE — HISTORY OF PRESENT ILLNESS
[Back Pain] : back pain [Neck Pain] : neck pain [Joint Pain] : joint  [10] : a maximum pain level of 10/10 [Sharp] : sharp [Aching] : aching [Standing] : standing [Walking] : walking [Bending] : bending [Sitting] : sitting [FreeTextEntry1] : Interval Note:\par \par Here for RIGHT glenohumeral joint steroid injection. In the past provided significant relief. In addition patient is interested in medicinal cannabis. Has spoken with her psychiatrist Dr. Beatty, who is in agreement. Currently utilizing Wellbutrin and Klonopin. Off Abilify. \par   \par \par HPI\par \par Ms. SARAHI MARTINEZ is a 77 year F with pmhx of COPD, htn on asa 81mg  PPHx of mood disorder followed by Dr. Foster, opioid use disorder previously suboxone SL 8-2mg and SL 2-0.5mg but has since discontinued, PMHx of spinal stenosis, HTN, s/p left femoral prosthesis, G-volution Scs device placed by Dr. Muniz.  Presents today with many years of bilateral lower back pain.  Patient states that the pain is worse with transition, axial, and prevents her from being able to tolerate ALDs.  It is even worse now that she is weaned from suboxone.  denies any worsening numbness, weakness, bowel/bladder dysfunction\par \par \par Previous and current pain medications/doses/effects:\par \par Previous Pain Treatments:\par \par Previous Pain Injections:\par RIGHT knee joint steroid injection 4/1/22\par RIGHT glenohumeral joint steroid injection 3/18/22\par right knee  steroid injection 1/21/22\par  right subacromial bursa steroid injection 7/16/21\par  RIGHT Knee genicular nerve ABLATION 5/13/21\par right knee ZILRETTA  steroid injection 11/12/20\par LEFT then RIGHT  L3-sacral ala medial branch radiofrequency ablation 8/2020\par  right knee Viscosupplementation injection completed 9/16/2020\par  right knee steroid injection 7/17/2020\par BILATERAL L3-sacral ala diagnostic medial branch block 7/16/2020\par  caudal epidural steroid injection 6/11/2020\par \par Previous Diagnostic Studies/Images:\par  \par XR R Shoulder 3/22\par \par Moderate degenerative spurring of the humeral head. If clinically indicated, further\par assessment of the shoulder with MRI is recommended.\par \par XR R knee 7/2020\par \par Severe degenerative arthritis of the right knee\par \par CT LS 5/2020\par \par There is sacralization of the L5 vertebral body.  The lumbar lordosis is preserved.  The\par vertebral body heights joint are maintained. There is mild disc space narrowing at at L1-L2.  A\par spinal stimulator is noted with the leads entering the thecal sac at the  T11-12 level.  The pre-\par vertebral soft tissues are unremarkable.\par \par  At the L1/2 level, there is a disc bulge without significant spinal canal stenosis or\par neuroforaminal narrowing..\par \par  At the L2/3 level, there is a disc bulge with facet and ligamentum flavum hypertrophy resulting in\par up to moderate spinal canal stenosis with mild to moderate bilateral neural foraminal narrowing..\par \par  At the L3/4 level, there is a disc bulge with facet and ligamentum flavum hypertrophy resulting in\par mild spinal canal stenosis with moderate bilateral neural foraminal narrowing.\par \par  At the L4/5 level, there is a disc bulge with facet and ligamentum flavum hypertrophy resulting in\par mild spinal canal stenosis with moderate to severe bilateral neural foraminal narrowing and possible\par impingement on the exiting nerve roots.\par \par  At the L5/S1 level, there is no disc herniation. There is no spinal canal stenosis or neural\par foramen narrowing.\par \par \par  IMPRESSION:\par \par  L4-5 degenerative changes resulting in mild spinal canal stenosis with moderate to severe bilateral\par neural foraminal narrowing and possible impingement on the exiting nerve root.\par \par  L3-4 degenerative changes resulting in moderate bilateral neural foraminal narrowing.\par

## 2023-03-31 NOTE — PHYSICAL EXAM
[Almaguer] : positive Almaguer Test [Neer's] : positive Neer's Test [Normal] : Normal affect [Spurling] : negative Spurling's Test

## 2023-03-31 NOTE — PROCEDURE
[FreeTextEntry1] : SHOULDER INJECTION ULTRASOUND\par \par The patient was given opportunity to ask questions regarding the procedure, its indications and the associated risks.  The risks of the procedure discussed include but are not limited to infection, bleeding, allergic reactions, nerve injuries, and side effects.  The patient showed understanding and wished to proceed.\par \par After obtaining informed consent, the patient's chart was reviewed, the site of operation was marked, and the patient's identification was confirmed.  The patient was brought to the Exam Room and placed in a seated position on the exam room table with the] [RIGHT hand resting on the contralateral shoulder. Strict sterile technique was used.  Skin was prepped with Chlorhexadine solution and draped in sterile manner.\par \par Using sterile technique, a high-frequency, linear-array ultrasound probe was placed in a position just caudad to the acromion and parallel with the spine of the scapula. The area between the glenoid and humeral head was identified.  An entry point just lateral to the ultrasound probe was anesthetized using a [30]g needle and [1]cc 1% lidocaine. Following this, a [21]g needle was inserted using an in-plane technique with the ultrasound such that the needle shaft and tip were visualized. When the needle tip reached the junction between the glenoid and humeral head, and after negative aspiration for heme, a mixture of 20mg kenalog with 4cc 0.5% bupivacaine was injected. The needle was then flushed with lidocaine and removed.  A band-aid dressing was applied.\par \par The patient tolerated the procedure well and there were no immediate adverse event. The patient was asked to follow up in 2 weeks and was instructed to call with fever, chills, increased pain, redness or swelling at the injection site, numbness or weakness.\par

## 2023-04-14 NOTE — HISTORY OF PRESENT ILLNESS
[TextBox_4] : 77 year old female with COPD, chronic respiratory failure came for f/u.\par Last seen in Sept 2022\par Last admission in January for CHF exacerbation\par Had a recent CT chest seen, my read: improvement in LtLL peribronchovascular infiltrate\par \par Feels at baseline. Uses O2 24 hrs a day. Uses NIV at night. Uses it only 3 nights a week.\par On Trelegy.\par She does not smoke cigarettes anymore. Still vapes e cigarettes.\par Denies cough. Dyspnea is at baseline\par Feels that the NIV does not give her enough pressure\par \par \par \par \par

## 2023-07-20 PROBLEM — E78.5 HYPERLIPIDEMIA, MILD: Status: ACTIVE | Noted: 2021-07-07

## 2023-07-20 NOTE — ASSESSMENT
[FreeTextEntry1] : pt is 77 year old woman\par a fib - awaiting mediciad to get eliquis - declines coumadin or asa\par \par copd on 4 liters\par does not  use nebs\par \par pain. OA - gets injections, medical marijuanna\par \par thyroid treated\par \par severe depression history - sees psych - on meds\par goes to AA on phone\par

## 2023-07-20 NOTE — REVIEW OF SYSTEMS
[Eyesight Problems] : eyesight problems [Shortness Of Breath] : shortness of breath [Joint Stiffness] : joint stiffness [Limb Pain] : limb pain [Negative] : Heme/Lymph [FreeTextEntry5] : palpitations occas - from coffee and monster - they precipitiated a fib in past [FreeTextEntry1] : small bruises are ars

## 2023-07-20 NOTE — PHYSICAL EXAM
[Alert] : alert [Well Nourished] : well nourished [Healthy Appearing] : healthy appearing [No Acute Distress] : in no acute distress [Well Developed] : well developed [Normal Voice/Communication] : normal voice/communication [No Oral Pallor] : no oral pallor [Normal Appearance] : the appearance of the neck was normal [Supple] : the neck was supple [Normal] : no respiratory distress, no accessory muscle use, normal respiratory rhythm and effort, lungs were clear to auscultation bilaterally [No Respiratory Distress] : no respiratory distress [No Acc Muscle Use] : no accessory muscle use [Respiration, Rhythm And Depth] : normal respiratory rhythm and effort [Auscultation Breath Sounds / Voice Sounds] : lungs were clear to auscultation bilaterally [Normal S1, S2] : normal S1 and S2 [Murmurs] : no murmurs [Edema] : edema was not present [Abdomen Tenderness] : non-tender [No Masses] : no abdominal mass palpated [Abdomen Soft] : soft [No Hernias] : no hernia was discovered [No Focal Deficits] : no focal deficits [Oriented To Time, Place, And Person] : oriented to person, place, and time [Normal Affect] : the affect was normal [de-identified] : irreg irreg [de-identified] : no rashes [de-identified] : depressed at times

## 2023-07-20 NOTE — HISTORY OF PRESENT ILLNESS
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, oxycodone, demerol\par is AA since 2018 \par h/o depression - Dr Aleta Campa\par \par DEpression\par CHF - distolic dysfusnction\par Af ib - currently on asa - canot afford eliquis\par COPD - on 4 lites oxygen\par canot afford trelogy\par OA- using medical marijuanna from pain center\par cataracts \par \par had flu ad pneumonia  vaccines\par had shingles \par had two covid vaccines\par no recent mammo or dexa\par had colon ca screening\par \par had labs wnl in MArch, 25 - wnl\par \par past - tummy tuck which complicated with massive bleed \par \par father comitted suicide\par \par neighbors help a little\par \par  trying to get medicaid

## 2023-07-21 PROBLEM — M75.51 SUBACROMIAL BURSITIS OF RIGHT SHOULDER JOINT: Status: ACTIVE | Noted: 2021-07-16

## 2023-07-21 PROBLEM — M79.18 MYOFASCIAL PAIN: Status: ACTIVE | Noted: 2020-03-24

## 2023-07-21 PROBLEM — G89.4 CHRONIC PAIN SYNDROME: Status: ACTIVE | Noted: 2020-03-24

## 2023-07-21 PROBLEM — M47.817 LUMBOSACRAL SPONDYLOSIS WITHOUT MYELOPATHY: Status: ACTIVE | Noted: 2020-03-24

## 2023-07-21 NOTE — ASSESSMENT
[FreeTextEntry1] : >> Imaging and Other Studies\par \par I personally reviewed the relevant imaging.  Discussed and explained to patient the likely source of pathology and pain.  Questions answered.\par \par I personally reviewed the relevant imaging.  Discussed and explained to patient the likely source of pathology and pain.  Questions answered. XR\par \par >> Therapy and Other Modalities\par \par continue home exercises\par \par >> Medications\par \par Patient evaluated for medical cannabis. Reviewed medical history and current medications. At this time just taking Wellbutrin for depression. Patient will increase frequency of visits with her psychiatrist to twice monthly or more frequently as per her psychiatrist. Monitor for worsening depression. Treatment team feels that patient meets criteria for medical cannabis certification including Chronic Pain that degrades functional capabilities. Reviewed potential for adverse events and stressed not to drive, make important life-changing decisions, operate heavy machinery during treatment or mix with sedation medications, emphasized not to mix with her Klonopin. Explained patient will need to report adverse events to provider. Encouraged patient to ask additional questions. Discussed certification process. On certification patient excluded from inhaled formulations due to COPD.  All questions answered. \par \par \par \par Certification process performed on [facility]\par \par Patient directed to Health system Website to find dispensary\par \par I-stop reference number #: [id number]\par \par Agreement for use of Controlled substance medication(s) completed\par \par continue robaxin prn spasm\par \par I advised SARAHI that the NSAID should be taken with food.  In addition while taking the prescribed NSAID, no over the counter or other NSAIDs should be used, such as ibuprofen (Motrin or Advil) or naproxen (Aleve) as this can cause stomach upset or other side effects.  If needed for fever or breakthrough pain Tylenol can be used.\par \par >> Interventions\par \par utilizing scs device\par \par subacromial bursitis pain refractory to conservative treatments. sp right subacromial bursa steroid injection r/b/a discussed\par \par  shoulder pain likely secondary to significant joint arthropathy demonstrated on imaging refractory to conservative treatments. sp  RIGHT glenohumeral joint steroid injection r/b/a discussed, consented\par \par given efficacy of previous intervention that is >80% improvement in pain and improved ability to perform adls, and return of pain despite conservative treatment, will schedule repeat  RIGHT glenohumeral joint steroid injection \par \par back and leg pain likely secondary to lumbar radiculopathy and discogenic pain refractory to conservative treatments in patient with hx of substance use disorder\par \par Right knee pain secondary to arthritis refractory to conservative treatments and steroid injection.  Significantly debilitating to patient. s/p right knee Viscosupplementation injection\par \par given efficacy of previous intervention that is >80% improvement in pain and improved ability to perform adls, and return of pain despite conservative treatment, sp right knee Viscosupplementation injection\par  \par \par \par severe knee pain secondary to osteoarthritis refractory to multiple conservative treatments as well as interventions.  Patient poor candidate for knee surgery and at this point, she is unwilling to consider surgical intervention.  s/p  RIGHT knee diagnostic genicular nerve block with 8 hours of 100% improvement\par \par Patient experienced 80% relief of knee pain for 2 days after genicular nerve block with improvement in ambulation and ability to perform adls.  Given significant relief, sp RIGHT Knee genicular nerve ABLATION\par \par Start PT for lumbar spondylosis and myofascial pain\par \par continue tizanidine prn spasm\par \par persistent pain secondary to lumbosacral stenosis demonstrated on imaging refractory to conservative treatments, s/p caudal epidural steroid injection with improvement in radicular pain\par \par SCS device interrogated and reprogrammed today - scanned into chart\par \par Significant component of axial back pain secondary to lumbar spondylosis and facet arthropathy demonstrated on MRI LS.  Pain refractory to conservative treatments.  s/p BILATERAL L3-sacral ala diagnostic medial branch block \par \par Given significant relief from diagnostic lumbar medial branch block of >80%, and continued lumbar facet arthropathy pain and axial back pain,s/p LEFT then RIGHT  L3-sacral ala medial branch radiofrequency ablation \par \par given efficacy of previous intervention that is >30% improvement in pain and improved ability to perform adls, and return of pain despite conservative treatment, may consider repeat LEFT then RIGHT  L4-sacral ala medial branch radiofrequency ablation (2 joints,  3 nerves)\par \par >> Consults\par \par patient will consult with psychiatry regarding appropriateness of medicinal cannabis\par >> Discussion of Risks/Benefits/Alternatives\par \par 	>Regarding any scheduled procedures:\par \par I have discussed in detail with the patient that any interventional pain procedure is associated with potential risks.  The procedure may include an injection of steroids and potentially other medications (local anesthetic and normal saline) into the epidural space or surrounding tissue of the spine.  There are significant risks of this procedure which include and are not limited to infection, bleeding, worsening pain, dural puncture leading to postdural puncture headache, nerve damage, spinal cord injury, paralysis, stroke, and death.  \par \par There is a chance that the procedure does not improve their pain.  \par \par There are risks associated with the steroid being absorbed into the body systemically.  These include dysphoria, difficulty sleeping, mood swings and personality changes.  Premenopausal women may notice an irregularity in her menstrual cycle for 2-3 months following the injection.  Steroids can specifically affect patients with hypertension, diabetes, and peptic ulcers.  The procedure may cause a temporary increase in blood pressure and blood pressure, and may adversely affect a peptic ulcer.  Other, more rare complications, include avascular necrosis of joints, glaucoma and worsening of osteoporosis. \par \par I have discussed the risks of the procedure at length with the patient, and the potential benefits of pain relief.  I have offered alternatives to the procedure.  All questions were answered.  \par \par The patient expressed understanding and wishes to proceed with the procedure.\par \par 	>Regarding COVID19 Pandemic: \par \par Any planned interventional pain procedure are scheduled because further delay may cause harm or negative outcome to patient.  The goal in performing this procedure is to avoid deterioration of function, emergency room visits (which increases exposure) and reliance on opioids.  \par \par r/b/a discussed with patient, lack of evidence to conclusively determine whether pain management procedures have any positive or negative impact on the possibility of alex the virus and/or development of any sequelae. \par \par Patient counselled regarding timing steroid based intervention 2 weeks before or after COVID-19 vaccine administration to avoid any interaction or affect on efficacy of vaccination\par \par Patient demonstrates understanding\par \par Informed patient that risks associated with the COVID-19 infection.  Informed patient steps taken to limit the risks.  We are implementing safety precautions and following protocols consistent with the CDC and state recommendations. All patients and staff will be checked for fever or signs of illness upon entry to the facility. We will limit our steroid dose to the lowest effective therapeutic dose or in some cases steroids will not be injected at all. \par \par Patient agrees to proceed\par \par >> Conclusion\par \par The above diagnosis and treatment plan is medically reasonable and necessary based on the patient encounter \par There were no barriers to communication.\par Informed patient that I would be available for any additional questions.\par Patient was instructed to call with any worsening symptoms including severe pain, new numbness/weakness, or changes in the bowel/bladder function. \par Discussed role of nsaids in pain management and all relevant risks, if patient is continuing to require after 4 weeks the patient should f/u for alternative treatment. \par Instructed patient to maintain pain diary to monitor pain level, mobility, and function.\par \par

## 2023-07-21 NOTE — HISTORY OF PRESENT ILLNESS
[8] : 3. What number best describes how, during the past week, pain has interfered with your general activity? 8/10 pain [FreeTextEntry1] : Interval Note:\par \par Patient reports that she has severe right shoulder pain, previously improved after glenohumeral injection.  She has found a good therapeutic combination of medical cannabis that helps her pain. \par   \par \par HPI\par \par Ms. SARAHI MARTINEZ is a 77 year F with pmhx of COPD, htn on asa 81mg  PPHx of mood disorder followed by Dr. Foster, opioid use disorder previously suboxone SL 8-2mg and SL 2-0.5mg but has since discontinued, PMHx of spinal stenosis, HTN, s/p left femoral prosthesis, [employer] Scs device placed by Dr. Muniz.  Presents today with many years of bilateral lower back pain.  Patient states that the pain is worse with transition, axial, and prevents her from being able to tolerate ALDs.  It is even worse now that she is weaned from suboxone.  denies any worsening numbness, weakness, bowel/bladder dysfunction\par \par \par Previous and current pain medications/doses/effects:\par \par Previous Pain Treatments:\par \par Previous Pain Injections:\par RIGHT knee joint steroid injection 4/1/22\par RIGHT glenohumeral joint steroid injection 3/18/22\par right knee  steroid injection 1/21/22\par  right subacromial bursa steroid injection 7/16/21\par  RIGHT Knee genicular nerve ABLATION 5/13/21\par right knee ZILRETTA  steroid injection 11/12/20\par LEFT then RIGHT  L3-sacral ala medial branch radiofrequency ablation 8/2020\par  right knee Viscosupplementation injection completed 9/16/2020\par  right knee steroid injection 7/17/2020\par BILATERAL L3-sacral ala diagnostic medial branch block 7/16/2020\par  caudal epidural steroid injection 6/11/2020\par \par Previous Diagnostic Studies/Images:\par  \par XR R Shoulder 3/22\par \par Moderate degenerative spurring of the humeral head. If clinically indicated, further\par assessment of the shoulder with MRI is recommended.\par \par XR R knee 7/2020\par \par Severe degenerative arthritis of the right knee\par \par CT LS 5/2020\par \par There is sacralization of the L5 vertebral body.  The lumbar lordosis is preserved.  The\par vertebral body heights joint are maintained. There is mild disc space narrowing at at L1-L2.  A\par spinal stimulator is noted with the leads entering the thecal sac at the  T11-12 level.  The pre-\par vertebral soft tissues are unremarkable.\par \par  At the L1/2 level, there is a disc bulge without significant spinal canal stenosis or\par neuroforaminal narrowing..\par \par  At the L2/3 level, there is a disc bulge with facet and ligamentum flavum hypertrophy resulting in\par up to moderate spinal canal stenosis with mild to moderate bilateral neural foraminal narrowing..\par \par  At the L3/4 level, there is a disc bulge with facet and ligamentum flavum hypertrophy resulting in\par mild spinal canal stenosis with moderate bilateral neural foraminal narrowing.\par \par  At the L4/5 level, there is a disc bulge with facet and ligamentum flavum hypertrophy resulting in\par mild spinal canal stenosis with moderate to severe bilateral neural foraminal narrowing and possible\par impingement on the exiting nerve roots.\par \par  At the L5/S1 level, there is no disc herniation. There is no spinal canal stenosis or neural\par foramen narrowing.\par \par \par  IMPRESSION:\par \par  L4-5 degenerative changes resulting in mild spinal canal stenosis with moderate to severe bilateral\par neural foraminal narrowing and possible impingement on the exiting nerve root.\par \par  L3-4 degenerative changes resulting in moderate bilateral neural foraminal narrowing.\par  [FreeTextEntry2] : 24

## 2023-07-21 NOTE — PROCEDURE
[FreeTextEntry1] : SHOULDER INJECTION ULTRASOUND RIGHT\par \par The patient was given opportunity to ask questions regarding the procedure, its indications and the associated risks.  The risks of the procedure discussed include but are not limited to infection, bleeding, allergic reactions, nerve injuries, and side effects.  The patient showed understanding and wished to proceed.\par \par After obtaining informed consent, the patient's chart was reviewed, the site of operation was marked, and the patient's identification was confirmed.  The patient was brought to the Exam Room and placed in a seated position on the exam room table with the] [RIGHT hand resting on the contralateral shoulder. Strict sterile technique was used.  Skin was prepped with Chlorhexadine solution and draped in sterile manner.\par \par Using sterile technique, a high-frequency, linear-array ultrasound probe was placed in a position just caudad to the acromion and parallel with the spine of the scapula. The area between the glenoid and humeral head was identified.  An entry point just lateral to the ultrasound probe was anesthetized using a [30]g needle and [1]cc 1% lidocaine. Following this, a [21]g needle was inserted using an in-plane technique with the ultrasound such that the needle shaft and tip were visualized. When the needle tip reached the junction between the glenoid and humeral head, and after negative aspiration for heme, a mixture of 20mg kenalog with 4cc 0.5% bupivacaine was injected. The needle was then flushed with lidocaine and removed.  A band-aid dressing was applied.\par \par The patient tolerated the procedure well and there were no immediate adverse event. The patient was asked to follow up in 2 weeks and was instructed to call with fever, chills, increased pain, redness or swelling at the injection site, numbness or weakness.\par

## 2023-07-21 NOTE — PHYSICAL EXAM
[Normal muscle bulk without asymmetry] : normal muscle bulk without asymmetry [Facet Tenderness] : no facet tenderness [Almaguer] : positive Almaguer Test [Neer's] : positive Neer's Test [Normal] : Normal affect

## 2023-09-15 PROBLEM — J96.11 CHRONIC RESPIRATORY FAILURE WITH HYPOXIA AND HYPERCAPNIA: Status: ACTIVE | Noted: 2021-09-23

## 2023-09-15 PROBLEM — R91.1 LUNG NODULE, SOLITARY: Status: ACTIVE | Noted: 2022-04-06

## 2023-10-12 PROBLEM — E03.9 HYPOTHYROID: Status: ACTIVE | Noted: 2022-02-17

## 2023-10-12 PROBLEM — F41.8 DEPRESSION WITH ANXIETY: Status: ACTIVE | Noted: 2021-07-07

## 2023-10-12 PROBLEM — Z23 NEED FOR INFLUENZA VACCINATION: Status: ACTIVE | Noted: 2023-01-01

## 2023-10-12 PROBLEM — M17.11 OSTEOARTHRITIS OF RIGHT KNEE: Status: ACTIVE | Noted: 2020-06-26

## 2023-10-12 PROBLEM — I10 ESSENTIAL HYPERTENSION: Status: ACTIVE | Noted: 2021-07-07

## 2023-10-12 PROBLEM — J44.9 COPD (CHRONIC OBSTRUCTIVE PULMONARY DISEASE): Status: ACTIVE | Noted: 2021-07-07

## 2023-10-12 PROBLEM — I48.91 ATRIAL FIBRILLATION: Status: ACTIVE | Noted: 2022-05-31

## 2023-10-12 PROBLEM — M19.019 SHOULDER ARTHRITIS: Status: ACTIVE | Noted: 2021-03-26

## 2023-10-12 PROBLEM — I50.32 CHRONIC DIASTOLIC HEART FAILURE: Status: ACTIVE | Noted: 2022-05-31

## 2023-10-12 PROBLEM — M48.061 LUMBAR SPINAL STENOSIS: Status: ACTIVE | Noted: 2020-03-24

## 2023-10-17 PROBLEM — E55.9 HYPOVITAMINOSIS D: Status: ACTIVE | Noted: 2023-01-01
